# Patient Record
Sex: FEMALE | HISPANIC OR LATINO | Employment: PART TIME | URBAN - METROPOLITAN AREA
[De-identification: names, ages, dates, MRNs, and addresses within clinical notes are randomized per-mention and may not be internally consistent; named-entity substitution may affect disease eponyms.]

---

## 2017-03-29 ENCOUNTER — HOSPITAL ENCOUNTER (OUTPATIENT)
Dept: LAB | Facility: MEDICAL CENTER | Age: 22
End: 2017-03-29
Attending: NURSE PRACTITIONER
Payer: COMMERCIAL

## 2017-03-29 ENCOUNTER — OFFICE VISIT (OUTPATIENT)
Dept: MEDICAL GROUP | Facility: PHYSICIAN GROUP | Age: 22
End: 2017-03-29
Payer: COMMERCIAL

## 2017-03-29 VITALS
TEMPERATURE: 97.5 F | HEIGHT: 59 IN | OXYGEN SATURATION: 97 % | SYSTOLIC BLOOD PRESSURE: 106 MMHG | BODY MASS INDEX: 19.15 KG/M2 | WEIGHT: 95 LBS | RESPIRATION RATE: 12 BRPM | HEART RATE: 63 BPM | DIASTOLIC BLOOD PRESSURE: 64 MMHG

## 2017-03-29 DIAGNOSIS — R07.89 ATYPICAL CHEST PAIN: ICD-10-CM

## 2017-03-29 DIAGNOSIS — Z76.89 ENCOUNTER TO ESTABLISH CARE: ICD-10-CM

## 2017-03-29 LAB
ALBUMIN SERPL BCP-MCNC: 4.6 G/DL (ref 3.2–4.9)
ALBUMIN/GLOB SERPL: 1.6 G/DL
ALP SERPL-CCNC: 82 U/L (ref 30–99)
ALT SERPL-CCNC: 15 U/L (ref 2–50)
ANION GAP SERPL CALC-SCNC: 7 MMOL/L (ref 0–11.9)
AST SERPL-CCNC: 21 U/L (ref 12–45)
BASOPHILS # BLD AUTO: 0.05 K/UL (ref 0–0.12)
BASOPHILS NFR BLD AUTO: 0.6 % (ref 0–1.8)
BILIRUB SERPL-MCNC: 0.6 MG/DL (ref 0.1–1.5)
BUN SERPL-MCNC: 11 MG/DL (ref 8–22)
CALCIUM SERPL-MCNC: 10 MG/DL (ref 8.5–10.5)
CHLORIDE SERPL-SCNC: 101 MMOL/L (ref 96–112)
CO2 SERPL-SCNC: 27 MMOL/L (ref 20–33)
CREAT SERPL-MCNC: 0.74 MG/DL (ref 0.5–1.4)
EOSINOPHIL # BLD: 0.07 K/UL (ref 0–0.51)
EOSINOPHIL NFR BLD AUTO: 0.9 % (ref 0–6.9)
ERYTHROCYTE [DISTWIDTH] IN BLOOD BY AUTOMATED COUNT: 39.7 FL (ref 35.9–50)
GLOBULIN SER CALC-MCNC: 2.9 G/DL (ref 1.9–3.5)
GLUCOSE SERPL-MCNC: 83 MG/DL (ref 65–99)
HCT VFR BLD AUTO: 45.6 % (ref 37–47)
HGB BLD-MCNC: 15.1 G/DL (ref 12–16)
IMM GRANULOCYTES # BLD AUTO: 0.03 K/UL (ref 0–0.11)
IMM GRANULOCYTES NFR BLD AUTO: 0.4 % (ref 0–0.9)
LYMPHOCYTES # BLD: 2.04 K/UL (ref 1–4.8)
LYMPHOCYTES NFR BLD AUTO: 26.1 % (ref 22–41)
MCH RBC QN AUTO: 29.7 PG (ref 27–33)
MCHC RBC AUTO-ENTMCNC: 33.1 G/DL (ref 33.6–35)
MCV RBC AUTO: 89.8 FL (ref 81.4–97.8)
MONOCYTES # BLD: 0.38 K/UL (ref 0–0.85)
MONOCYTES NFR BLD AUTO: 4.9 % (ref 0–13.4)
NEUTROPHILS # BLD: 5.26 K/UL (ref 2–7.15)
NEUTROPHILS NFR BLD AUTO: 67.1 % (ref 44–72)
NRBC # BLD AUTO: 0 K/UL
NRBC BLD-RTO: 0 /100 WBC
PLATELET # BLD AUTO: 238 K/UL (ref 164–446)
PMV BLD AUTO: 11.1 FL (ref 9–12.9)
POTASSIUM SERPL-SCNC: 4.1 MMOL/L (ref 3.6–5.5)
PROT SERPL-MCNC: 7.5 G/DL (ref 6–8.2)
RBC # BLD AUTO: 5.08 M/UL (ref 4.2–5.4)
SODIUM SERPL-SCNC: 135 MMOL/L (ref 135–145)
TSH SERPL DL<=0.005 MIU/L-ACNC: 0.55 UIU/ML (ref 0.3–3.7)
WBC # BLD AUTO: 7.8 K/UL (ref 4.8–10.8)

## 2017-03-29 PROCEDURE — 84443 ASSAY THYROID STIM HORMONE: CPT

## 2017-03-29 PROCEDURE — 80053 COMPREHEN METABOLIC PANEL: CPT

## 2017-03-29 PROCEDURE — 85025 COMPLETE CBC W/AUTO DIFF WBC: CPT

## 2017-03-29 PROCEDURE — 99214 OFFICE O/P EST MOD 30 MIN: CPT | Performed by: NURSE PRACTITIONER

## 2017-03-29 PROCEDURE — 36415 COLL VENOUS BLD VENIPUNCTURE: CPT

## 2017-03-29 ASSESSMENT — PATIENT HEALTH QUESTIONNAIRE - PHQ9: CLINICAL INTERPRETATION OF PHQ2 SCORE: 0

## 2017-03-29 NOTE — PROGRESS NOTES
Chief Complaint   Patient presents with   • New Patient     check up, chest pain x 1 month       HPI:    Sandra Rouse is a 21 y.o. female here to establish care, accompanied today by her mother.     Atypical chest pain  Symptoms started about one month ago. She gets intermittent chest pain about 4 out of 7 days of the week. It comes and goes. If she is anxious or stressed, the pain will increase. She has been experiencing nausea and vomiting when this occurs as well. It is accompanied by hot flashes and lightheadedness. It has awakened her at night a few times, with one occurrence with tingling sensation in left arm. There is also associated sensations of shortness of breath when this occurs  Physical activity like running for more than 10 minutes will cause her to start feeling nauseated. She has tried yoga to help with stress levels.    She has been anxious about school because she had to repeat her semester. She also stopped working to help with her stress levels      Current medicines (including changes today)  No current outpatient prescriptions on file.     No current facility-administered medications for this visit.     She  has no past medical history of Asthma or Migraine.  She  has no past surgical history on file.  Social History   Substance Use Topics   • Smoking status: Never Smoker    • Smokeless tobacco: Never Used   • Alcohol Use: Yes      Comment: occasionally      Social History     Social History Narrative    Patient is single, lives with her family. In school full time, not working      Family History   Problem Relation Age of Onset   • Thyroid Mother    • No Known Problems Father    • Breast Cancer Maternal Aunt 39   • Hypertension Maternal Grandmother    • Heart Disease Paternal Grandmother    • Diabetes Neg Hx    • Depression Neg Hx    • Heart Attack Neg Hx    • Stroke Neg Hx      Family Status   Relation Status Death Age   • Mother Alive    • Father Alive    • Brother Alive    •  "Maternal Aunt Alive      Health Maintenance Topics with due status: Overdue       Topic Date Due    IMM HEP B VACCINE 1995    IMM HEP A VACCINE 08/30/1997    IMM HPV VACCINE 08/30/2007    IMM VARICELLA (CHICKENPOX) VACCINE 08/30/2009    CHLAMYDIA SCREENING 08/30/2012    IMM MENINGOCOCCAL VACCINE (MCV4) 08/30/2012    IMM DTaP/Tdap/Td Vaccine 08/30/2015    IMM INFLUENZA 09/01/2016        ROS  Constitutional: +insomnia. No F/C, night sweats, fatigue, weight gain or loss  Head/Neck: No headache or head injury. No neck pain or limitation of motion, lumps or swelling, stiffness, or leisa enlargement  Eyes: No change in vision, flashing lights, pain, redness, discharge  Ears: right ear pain. No tinnitus, discharge  Nose: No discharge, sinus pain, nosebleeds, allergies  Mouth/Throat: No sores or lesions, sore throat, hoarseness, dysphagia  Lungs: +cough for 3 weeks. No wheezing.  Cardiac: +chest pain and heart racing. No palpitations, syncope or near syncope, NAJERA, or LE edema  Skin: No color changes, itching, dryness, rashes  Heme: No increased bruising or prolonged bleeding  Endo: No heat or cold intolerance, excessively dry skin, sweating, polydipsia, polyuria, or polyphagia.    GI: No pain, n/v, heartburn, blood in stool, constipation or diarrhea  : No dysuria or hematuria   MSK: No joint pain, stiffness, swelling, heat, redness, or limitation of movement.        Objective:     Blood pressure 106/64, pulse 63, temperature 36.4 °C (97.5 °F), resp. rate 12, height 1.511 m (4' 11.49\"), weight 43.092 kg (95 lb), last menstrual period 03/14/2017, SpO2 97 %. Body mass index is 18.87 kg/(m^2).  Physical Exam:  Alert, oriented in no acute distress.  Eye contact is good, speech goal directed, affect bright.  HEENT: EOMI, PERRL, conjunctiva non-injected, sclera non-icteric.  Nares patent with no significant congestion or drainage.  Kassidy pinna, external auditory canals, TM pearly gray with normal light reflex " bilaterally.  Oral mucous membranes pink and moist with no lesions.Oropharynx without erythema or exudate  Neck: supple with no cervical or supraclavicular lymphadenopathy, palpable thyroid nodules or thyromegaly.  Lungs: unlabored, clear to auscultation bilaterally with good excursion.  CV: regular rate and rhythm, no murmurs, no carotid bruits.   Abdomen: soft, non-distended, non-tender. No hernias, hepatosplenomegaly, or masses.   Lower extremities color normal, vascularity normal, no edema, temperature normal  Neuro: Gait steady. Strength all extremities 5/5.     EKG Interpretation-HR is 76 normal EKG, normal sinus rhythm      Assessment and Plan:   The following treatment plan was discussed  1. Encounter to establish care     2. Atypical chest pain  EKG normal without any sign of ischemia that would be responsible for her chest pain. Recommended patient try taking a Tums or Pepcid when this occurs to see if this offers any relief. If not, we have discussed options for management of anxiety and stress levels. Patient reports she feels like this would be beneficial to her and she believes that this is a lot of the cause of her chest pain. Have given her information on a couple of places to call to see if they take her insurance and then once blood work returns if all is normal we will refer her  CBC WITH DIFFERENTIAL    COMP METABOLIC PANEL    TSH    EKG       Followup: Return in about 3 months (around 6/29/2017). sooner should new symptoms or problems arise.

## 2017-03-29 NOTE — ASSESSMENT & PLAN NOTE
Symptoms started about one month ago. She gets intermittent chest pain about 4 out of 7 days of the week. It comes and goes. If she is anxious or stressed, the pain will increase. She has been experiencing nausea and vomiting when this occurs as well. It is accompanied by hot flashes and lightheadedness. It has awakened her at night a few times, with one occurrence with tingling sensation in left arm.  Physical activity like running for more than 10 minutes will cause her to start feeling nauseated. She has tried yoga to help with stress levels.    She has been anxious about school because she had to repeat her semester. She also stopped working.

## 2017-07-16 ENCOUNTER — HOSPITAL ENCOUNTER (OUTPATIENT)
Facility: MEDICAL CENTER | Age: 22
End: 2017-07-16
Attending: EMERGENCY MEDICINE
Payer: COMMERCIAL

## 2017-07-16 ENCOUNTER — OFFICE VISIT (OUTPATIENT)
Dept: URGENT CARE | Facility: PHYSICIAN GROUP | Age: 22
End: 2017-07-16
Payer: COMMERCIAL

## 2017-07-16 VITALS
HEIGHT: 59 IN | RESPIRATION RATE: 12 BRPM | HEART RATE: 79 BPM | OXYGEN SATURATION: 98 % | BODY MASS INDEX: 20.22 KG/M2 | WEIGHT: 100.3 LBS | DIASTOLIC BLOOD PRESSURE: 60 MMHG | SYSTOLIC BLOOD PRESSURE: 102 MMHG | TEMPERATURE: 98.3 F

## 2017-07-16 DIAGNOSIS — N39.0 URINARY TRACT INFECTION WITH HEMATURIA, SITE UNSPECIFIED: ICD-10-CM

## 2017-07-16 DIAGNOSIS — R39.9 SYMPTOMS INVOLVING URINARY SYSTEM: ICD-10-CM

## 2017-07-16 DIAGNOSIS — R31.9 URINARY TRACT INFECTION WITH HEMATURIA, SITE UNSPECIFIED: ICD-10-CM

## 2017-07-16 LAB
APPEARANCE UR: NORMAL
BILIRUB UR STRIP-MCNC: NEGATIVE MG/DL
COLOR UR AUTO: NORMAL
GLUCOSE UR STRIP.AUTO-MCNC: NEGATIVE MG/DL
INT CON NEG: NORMAL
INT CON POS: NORMAL
KETONES UR STRIP.AUTO-MCNC: NEGATIVE MG/DL
LEUKOCYTE ESTERASE UR QL STRIP.AUTO: NORMAL
NITRITE UR QL STRIP.AUTO: NEGATIVE
PH UR STRIP.AUTO: 8 [PH] (ref 5–8)
POC URINE PREGNANCY TEST: NORMAL
PROT UR QL STRIP: NORMAL MG/DL
RBC UR QL AUTO: NORMAL
SP GR UR STRIP.AUTO: 1.01
UROBILINOGEN UR STRIP-MCNC: NEGATIVE MG/DL

## 2017-07-16 PROCEDURE — 87086 URINE CULTURE/COLONY COUNT: CPT

## 2017-07-16 PROCEDURE — 87591 N.GONORRHOEAE DNA AMP PROB: CPT

## 2017-07-16 PROCEDURE — 99214 OFFICE O/P EST MOD 30 MIN: CPT | Performed by: EMERGENCY MEDICINE

## 2017-07-16 PROCEDURE — 81025 URINE PREGNANCY TEST: CPT | Performed by: EMERGENCY MEDICINE

## 2017-07-16 PROCEDURE — 87491 CHLMYD TRACH DNA AMP PROBE: CPT

## 2017-07-16 PROCEDURE — 81002 URINALYSIS NONAUTO W/O SCOPE: CPT | Performed by: EMERGENCY MEDICINE

## 2017-07-16 RX ORDER — SULFAMETHOXAZOLE AND TRIMETHOPRIM 800; 160 MG/1; MG/1
1 TABLET ORAL EVERY 12 HOURS
Qty: 28 TAB | Refills: 0 | Status: SHIPPED | OUTPATIENT
Start: 2017-07-16 | End: 2017-07-30

## 2017-07-16 ASSESSMENT — ENCOUNTER SYMPTOMS
ABDOMINAL PAIN: 1
VOMITING: 1
ANOREXIA: 0
TINGLING: 0
CHILLS: 0
BLOOD IN STOOL: 0
FEVER: 0
SENSORY CHANGE: 0
CHANGE IN BOWEL HABIT: 1
FLANK PAIN: 1
NAUSEA: 1

## 2017-07-17 DIAGNOSIS — R39.9 SYMPTOMS INVOLVING URINARY SYSTEM: ICD-10-CM

## 2017-07-17 LAB
C TRACH DNA SPEC QL NAA+PROBE: NEGATIVE
N GONORRHOEA DNA SPEC QL NAA+PROBE: NEGATIVE
SPECIMEN SOURCE: NORMAL

## 2017-07-17 NOTE — PROGRESS NOTES
Subjective:      Sandra Keneny is a 21 y.o. female who presents with Other            UTI  This is a new problem. Episode onset: 2 days. The problem has been gradually worsening. Associated symptoms include abdominal pain, a change in bowel habit, nausea, urinary symptoms and vomiting. Pertinent negatives include no anorexia, chills, fever or rash. She has tried nothing for the symptoms.    no past medical history of urinary tract problems. Onset of illness with lower backache for several days; no trauma. Then 2 days ago began with dysuria, urinary frequency and urgency and an episode of nausea and vomiting without hematemesis. Yesterday dysuria improved, but noted increased urinary urgency, blood when wiping the area. Notes also loose stools without diarrhea; no melena or hematochezia. She notes sensation of suprapubic fullness, now more right lower back ache sensation. She notes last menstrual period approximately one week ago; on oral contraception. Admits sexually active; denies STD exposure. No vaginal bleeding, discharge, or rash.    Review of Systems   Constitutional: Negative for fever and chills.   Gastrointestinal: Positive for nausea, vomiting, abdominal pain and change in bowel habit. Negative for blood in stool and anorexia.   Genitourinary: Positive for dysuria, urgency, frequency, hematuria and flank pain.   Skin: Negative for itching and rash.   Neurological: Negative for tingling and sensory change.     PMH:  has no past medical history of Asthma or Migraine.  MEDS:   Current outpatient prescriptions:   •  sulfamethoxazole-trimethoprim (BACTRIM DS) 800-160 MG tablet, Take 1 Tab by mouth every 12 hours for 14 days., Disp: 28 Tab, Rfl: 0  •  norethindrone-ethinyl estradiol-iron (MICROGESTIN FE1.5/30) 1.5-30 MG-MCG tablet, Take 1 Tab by mouth every day., Disp: 28 Tab, Rfl: 11  •  ibuprofen (MOTRIN) 800 MG Tab, Take 800 mg by mouth every 8 hours as needed., Disp: , Rfl:   •  ibuprofen (MOTRIN) 600  "MG Tab, Take 1 Tab by mouth every 6 hours as needed., Disp: 30 Tab, Rfl: 2  •  tramadol (ULTRAM) 50 MG Tab, Take 1 Tab by mouth every 6 hours as needed for Moderate Pain., Disp: 15 Tab, Rfl: 0  ALLERGIES: No Known Allergies  SURGHX: History reviewed. No pertinent past surgical history.  SOCHX:  reports that she has never smoked. She has never used smokeless tobacco. She reports that she drinks alcohol. She reports that she does not use illicit drugs.  FH: family history includes Breast Cancer (age of onset: 39) in her maternal aunt; Heart Disease in her paternal grandmother; Hypertension in her maternal grandmother; No Known Problems in her father; Thyroid in her mother. There is no history of Diabetes, Depression, Heart Attack, or Stroke.       Objective:     /60 mmHg  Pulse 79  Temp(Src) 36.8 °C (98.3 °F)  Resp 12  Ht 1.499 m (4' 11.02\")  Wt 45.496 kg (100 lb 4.8 oz)  BMI 20.25 kg/m2  SpO2 98%  Breastfeeding? No     Physical Exam   Constitutional: She is oriented to person, place, and time. She appears well-developed and well-nourished. She is cooperative. She does not have a sickly appearance. She does not appear ill. No distress.   Appears well-hydrated.   Eyes: Conjunctivae are normal. No scleral icterus.   Neck: Phonation normal. Neck supple.   Cardiovascular: Normal rate, regular rhythm and normal heart sounds.    Pulmonary/Chest: Effort normal and breath sounds normal.   Abdominal: Soft. Bowel sounds are normal. She exhibits no distension and no mass. There is no hepatosplenomegaly. There is tenderness in the suprapubic area. There is no rigidity, no rebound, no guarding, no CVA tenderness and no tenderness at McBurney's point.   Musculoskeletal:        Lumbar back: She exhibits normal range of motion and no bony tenderness.   Neurological: She is alert and oriented to person, place, and time.   Skin: Skin is warm and dry.   Psychiatric: She has a normal mood and affect.          Onset " associated with cystitis symptoms; somewhat atypical for episode with nausea and vomiting. Current symptoms not highly suspect for pyelonephritis; but will treat for complicated UTI.  Assessment/Plan:     1. Urinary tract infection with hematuria, site unspecified [N39.0, R31.9]  - sulfamethoxazole-trimethoprim (BACTRIM DS) 800-160 MG tablet; Take 1 Tab by mouth every 12 hours for 14 days.  Dispense: 28 Tab; Refill: 0    2. Symptoms involving urinary system  Positive LE, positive blood- POCT Urinalysis  Negative- POCT Pregnancy  - URINE CULTURE(NEW); Future  - CHLAMYDIA/GC PCR URINE OR SWAB; Future

## 2017-07-17 NOTE — PATIENT INSTRUCTIONS
Go to the nearest hospital Emergency Department, or call 911, if any worsening condition.  Use over-the-counter pain reliever, such as acetaminophen (Tylenol), ibuprofen (Advil, Motrin) or naproxen (Aleve) as needed; follow package directions for dosing.Use an oral probiotic daily, such as Culturelle, Align, or yogurt to reduce gastrointestinal symptoms.  Test results will be available in the next 1-3 days; the clinic will contact you with results.    Urinary Tract Infection  Urinary tract infections (UTIs) can develop anywhere along your urinary tract. Your urinary tract is your body's drainage system for removing wastes and extra water. Your urinary tract includes two kidneys, two ureters, a bladder, and a urethra. Your kidneys are a pair of bean-shaped organs. Each kidney is about the size of your fist. They are located below your ribs, one on each side of your spine.  CAUSES  Infections are caused by microbes, which are microscopic organisms, including fungi, viruses, and bacteria. These organisms are so small that they can only be seen through a microscope. Bacteria are the microbes that most commonly cause UTIs.  SYMPTOMS   Symptoms of UTIs may vary by age and gender of the patient and by the location of the infection. Symptoms in young women typically include a frequent and intense urge to urinate and a painful, burning feeling in the bladder or urethra during urination. Older women and men are more likely to be tired, shaky, and weak and have muscle aches and abdominal pain. A fever may mean the infection is in your kidneys. Other symptoms of a kidney infection include pain in your back or sides below the ribs, nausea, and vomiting.  DIAGNOSIS  To diagnose a UTI, your caregiver will ask you about your symptoms. Your caregiver also will ask to provide a urine sample. The urine sample will be tested for bacteria and white blood cells. White blood cells are made by your body to help fight infection.  TREATMENT    Typically, UTIs can be treated with medication. Because most UTIs are caused by a bacterial infection, they usually can be treated with the use of antibiotics. The choice of antibiotic and length of treatment depend on your symptoms and the type of bacteria causing your infection.  HOME CARE INSTRUCTIONS  · If you were prescribed antibiotics, take them exactly as your caregiver instructs you. Finish the medication even if you feel better after you have only taken some of the medication.  · Drink enough water and fluids to keep your urine clear or pale yellow.  · Avoid caffeine, tea, and carbonated beverages. They tend to irritate your bladder.  · Empty your bladder often. Avoid holding urine for long periods of time.  · Empty your bladder before and after sexual intercourse.  · After a bowel movement, women should cleanse from front to back. Use each tissue only once.  SEEK MEDICAL CARE IF:   · You have back pain.  · You develop a fever.  · Your symptoms do not begin to resolve within 3 days.  SEEK IMMEDIATE MEDICAL CARE IF:   · You have severe back pain or lower abdominal pain.  · You develop chills.  · You have nausea or vomiting.  · You have continued burning or discomfort with urination.  MAKE SURE YOU:   · Understand these instructions.  · Will watch your condition.  · Will get help right away if you are not doing well or get worse.     This information is not intended to replace advice given to you by your health care provider. Make sure you discuss any questions you have with your health care provider.     Document Released: 09/27/2006 Document Revised: 01/08/2016 Document Reviewed: 01/25/2013  [employer] Interactive Patient Education ©2016 [employer]

## 2017-07-18 ENCOUNTER — TELEPHONE (OUTPATIENT)
Dept: URGENT CARE | Facility: PHYSICIAN GROUP | Age: 22
End: 2017-07-18

## 2017-07-18 NOTE — TELEPHONE ENCOUNTER
Left phone message for patient to call back [place] .  Please advise patient of negative urine culture test; recommend return for re-evaluation today, or ED if not available.

## 2017-07-19 LAB
BACTERIA UR CULT: NORMAL
SIGNIFICANT IND 70042: NORMAL
SOURCE SOURCE: NORMAL

## 2017-08-05 ENCOUNTER — OFFICE VISIT (OUTPATIENT)
Dept: URGENT CARE | Facility: PHYSICIAN GROUP | Age: 22
End: 2017-08-05
Payer: COMMERCIAL

## 2017-08-05 VITALS
BODY MASS INDEX: 19.76 KG/M2 | OXYGEN SATURATION: 99 % | SYSTOLIC BLOOD PRESSURE: 96 MMHG | DIASTOLIC BLOOD PRESSURE: 62 MMHG | TEMPERATURE: 99 F | HEART RATE: 76 BPM | HEIGHT: 59 IN | WEIGHT: 98 LBS

## 2017-08-05 DIAGNOSIS — R21 RASH AND NONSPECIFIC SKIN ERUPTION: Primary | ICD-10-CM

## 2017-08-05 DIAGNOSIS — B35.4 RINGWORM OF BODY: ICD-10-CM

## 2017-08-05 PROCEDURE — 99214 OFFICE O/P EST MOD 30 MIN: CPT | Performed by: PHYSICIAN ASSISTANT

## 2017-08-05 RX ORDER — HYDROXYZINE HYDROCHLORIDE 25 MG/1
25 TABLET, FILM COATED ORAL NIGHTLY PRN
Qty: 30 TAB | Refills: 0 | Status: SHIPPED | OUTPATIENT
Start: 2017-08-05 | End: 2018-11-25

## 2017-08-05 RX ORDER — THERMOMETER, ELECTRONIC,ORAL
EACH MISCELLANEOUS
Qty: 1 TUBE | Refills: 0 | Status: SHIPPED | OUTPATIENT
Start: 2017-08-05 | End: 2018-11-25

## 2017-08-05 RX ORDER — THERMOMETER, ELECTRONIC,ORAL
EACH MISCELLANEOUS
Qty: 1 TUBE | Refills: 0 | Status: SHIPPED | OUTPATIENT
Start: 2017-08-05 | End: 2017-08-05 | Stop reason: SDUPTHER

## 2017-08-05 ASSESSMENT — PAIN SCALES - GENERAL: PAINLEVEL: NO PAIN

## 2017-08-05 NOTE — MR AVS SNAPSHOT
"Sandra Rouse   2017 11:45 AM   Office Visit   MRN: [id number]    Department:  [facility]   Dept Phone:  [phone]    Description:  Female : 1995   Provider:  Kathleen Wilcox PA-C           Reason for Visit     Rash abdomen, back, leg, x 1 week      Allergies as of 2017     No Known Allergies      You were diagnosed with     Rash and nonspecific skin eruption   [043162]         Vital Signs     Blood Pressure Pulse Temperature Height Weight Body Mass Index    96/62 mmHg 76 37.2 °C (99 °F) 1.511 m (4' 11.49\") 44.453 kg (98 lb) 19.47 kg/m2    Oxygen Saturation Last Menstrual Period Breastfeeding? Smoking Status          99% 2017 No Never Smoker         Basic Information     Date Of Birth Sex Race Ethnicity Preferred Language    1995 Female  or , Unable to Obtain Unknown English      Your appointments     Sep 11, 2017 11:00 AM   Annual Exam with Stefanie Walton M.D.   [facility]    [address]  [facility] [id number] [phone]              Problem List              ICD-10-CM Priority Class Noted - Resolved    Breakthrough bleeding on birth control pills N92.1   2016 - Present    Atypical chest pain R07.89   3/29/2017 - Present      Health Maintenance        Date Due Completion Dates    IMM MENINGOCOCCAL VACCINE (MCV4) (2 of 2) 2009    IMM INFLUENZA (1) 2017 ---    IMM DTaP/Tdap/Td Vaccine (2 - Td) 2018            Current Immunizations     HPV 9-VALENT VACCINE (GARDASIL 9) 2011, 2010, 2009    Hepatitis A Vaccine, Ped/Adol 2007, 2003    Hepatitis B Vaccine Non-Recombivax (Ped/Adol) 1997, 1996, 1996    Meningococcal Conjugate Vaccine MCV4 (Menactra) 2009    Tdap Vaccine 2008    Tuberculin Skin Test 2013  3:10 PM, 2013  3:15 PM    Varicella Vaccine Live 2007, 1999      Below " and/or attached are the medications your provider expects you to take. Review all of your home medications and newly ordered medications with your provider and/or pharmacist. Follow medication instructions as directed by your provider and/or pharmacist. Please keep your medication list with you and share with your provider. Update the information when medications are discontinued, doses are changed, or new medications (including over-the-counter products) are added; and carry medication information at all times in the event of emergency situations     Allergies:  No Known Allergies          Medications  Valid as of: August 05, 2017 - 12:54 PM    Generic Name Brand Name Tablet Size Instructions for use    HydrOXYzine HCl (Tab) ATARAX 25 MG Take 1 Tab by mouth at bedtime as needed for Itching.        Ibuprofen (Tab) MOTRIN 800 MG Take 800 mg by mouth every 8 hours as needed.        Ibuprofen (Tab) MOTRIN 600 MG Take 1 Tab by mouth every 6 hours as needed.        Norethin Ace-Eth Estrad-FE (Tab) MICROGESTIN FE1.5/30 1.5-30 MG-MCG Take 1 Tab by mouth every day.        Tolnaftate (Cream) TINACTIN 1 % Apply to rash 2 times daily until rash resolves and then for 2 additional weeks.        TraMADol HCl (Tab) ULTRAM 50 MG Take 1 Tab by mouth every 6 hours as needed for Moderate Pain.        .                 Medicines prescribed today were sent to:     [facility] [address] [place] [id number]    Phone: [phone] Fax: [phone]    Open 24 Hours?: No      Medication refill instructions:       If your prescription bottle indicates you have medication refills left, it is not necessary to call your provider’s office. Please contact your pharmacy and they will refill your medication.    If your prescription bottle indicates you do not have any refills left, you may request refills at any time through one of the following ways: The online [employer] system (except  Urgent Care), by calling your provider’s office, or by asking your pharmacy to contact your provider’s office with a refill request. Medication refills are processed only during regular business hours and may not be available until the next business day. Your provider may request additional information or to have a follow-up visit with you prior to refilling your medication.   *Please Note: Medication refills are assigned a new Rx number when refilled electronically. Your pharmacy may indicate that no refills were authorized even though a new prescription for the same medication is available at the pharmacy. Please request the medicine by name with the pharmacy before contacting your provider for a refill.        Instructions    Rash  A rash is a change in the color or texture of the skin. There are many different types of rashes. You may have other problems that accompany your rash.  CAUSES   · Infections.  · Allergic reactions. This can include allergies to pets or foods.  · Certain medicines.  · Exposure to certain chemicals, soaps, or cosmetics.  · Heat.  · Exposure to poisonous plants.  · Tumors, both cancerous and noncancerous.  SYMPTOMS   · Redness.  · Scaly skin.  · Itchy skin.  · Dry or cracked skin.  · Bumps.  · Blisters.  · Pain.  DIAGNOSIS   Your caregiver may do a physical exam to determine what type of rash you have. A skin sample (biopsy) may be taken and examined under a microscope.  TREATMENT   Treatment depends on the type of rash you have. Your caregiver may prescribe certain medicines. For serious conditions, you may need to see a skin doctor (dermatologist).  HOME CARE INSTRUCTIONS   · Avoid the substance that caused your rash.  · Do not scratch your rash. This can cause infection.  · You may take cool baths to help stop itching.  · Only take over-the-counter or prescription medicines as directed by your caregiver.  · Keep all follow-up appointments as directed by your caregiver.  SEEK IMMEDIATE  MEDICAL CARE IF:  · You have increasing pain, swelling, or redness.  · You have a fever.  · You have new or severe symptoms.  · You have body aches, diarrhea, or vomiting.  · Your rash is not better after 3 days.  MAKE SURE YOU:  · Understand these instructions.  · Will watch your condition.  · Will get help right away if you are not doing well or get worse.     This information is not intended to replace advice given to you by your health care provider. Make sure you discuss any questions you have with your health care provider.     Document Released: 12/08/2003 Document Revised: 01/08/2016 Document Reviewed: 10/01/2012  [employer] Interactive Patient Education ©2016 [employer]            [employer] Access Code: Activation code not generated  Current [employer] Status: Active

## 2017-08-05 NOTE — PROGRESS NOTES
"Subjective:      Sandra Rouse is a 21 y.o. female who presents with Rash    Pt PMH, SocHx, SurgHx, FamHx, Drug allergies and medications reviewed with pt/EPIC.      Family history reviewed, it is not pertinent to this complaint.           HPI Comments:         Rash  This is a new problem. The current episode started in the past 7 days. The problem is unchanged. The rash is characterized by itchiness, redness and scaling. It is unknown if there was an exposure to a precipitant. Pertinent negatives include no fever or sore throat. Past treatments include nothing. The treatment provided no relief. There is no history of eczema.       Review of Systems   Constitutional: Negative for fever.   HENT: Negative for sore throat.    Skin: Positive for itching and rash.   All other systems reviewed and are negative.         Objective:     BP 96/62 mmHg  Pulse 76  Temp(Src) 37.2 °C (99 °F)  Ht 1.511 m (4' 11.49\")  Wt 44.453 kg (98 lb)  BMI 19.47 kg/m2  SpO2 99%  LMP 08/02/2017  Breastfeeding? No     Physical Exam   Skin: Rash noted. Rash is maculopapular.        Few maculopapular lesions to abdomen and back, very itchy.  Rash does not follow any particular pattern, not consistent with scabies or  bed bugs. More likely insect bites.      Three lesions to legs appear like tinea.           Assessment/Plan:     1. Rash and nonspecific skin eruption  hydrOXYzine (ATARAX) 25 MG Tab    tolnaftate (TINACTIN) 1 % Cream    DISCONTINUED: tolnaftate (TINACTIN) 1 % Cream   2. Ringworm of body       PT should follow up with PCP in 1-2 days for re-evaluation if symptoms have not improved.  Discussed red flags and reasons to return to UC or ED.  Pt and/or family verbalized understanding of diagnosis and follow up instructions and was given informational handout on diagnosis.  PT discharged.         "

## 2017-08-05 NOTE — PATIENT INSTRUCTIONS
Rash  A rash is a change in the color or texture of the skin. There are many different types of rashes. You may have other problems that accompany your rash.  CAUSES   · Infections.  · Allergic reactions. This can include allergies to pets or foods.  · Certain medicines.  · Exposure to certain chemicals, soaps, or cosmetics.  · Heat.  · Exposure to poisonous plants.  · Tumors, both cancerous and noncancerous.  SYMPTOMS   · Redness.  · Scaly skin.  · Itchy skin.  · Dry or cracked skin.  · Bumps.  · Blisters.  · Pain.  DIAGNOSIS   Your caregiver may do a physical exam to determine what type of rash you have. A skin sample (biopsy) may be taken and examined under a microscope.  TREATMENT   Treatment depends on the type of rash you have. Your caregiver may prescribe certain medicines. For serious conditions, you may need to see a skin doctor (dermatologist).  HOME CARE INSTRUCTIONS   · Avoid the substance that caused your rash.  · Do not scratch your rash. This can cause infection.  · You may take cool baths to help stop itching.  · Only take over-the-counter or prescription medicines as directed by your caregiver.  · Keep all follow-up appointments as directed by your caregiver.  SEEK IMMEDIATE MEDICAL CARE IF:  · You have increasing pain, swelling, or redness.  · You have a fever.  · You have new or severe symptoms.  · You have body aches, diarrhea, or vomiting.  · Your rash is not better after 3 days.  MAKE SURE YOU:  · Understand these instructions.  · Will watch your condition.  · Will get help right away if you are not doing well or get worse.     This information is not intended to replace advice given to you by your health care provider. Make sure you discuss any questions you have with your health care provider.     Document Released: 12/08/2003 Document Revised: 01/08/2016 Document Reviewed: 10/01/2012  [employer] Interactive Patient Education ©2016 [employer]

## 2017-08-05 NOTE — Clinical Note
August 5, 2017         Patient: Sandra Rouse   YOB: 1995   Date of Visit: 8/5/2017           To Whom it May Concern:    Sandra Rouse was seen in my clinic on 8/5/2017. She may return to work today.     If you have any questions or concerns, please don't hesitate to call.        Sincerely,           Kathleen Wilcox PA-C  Electronically Signed

## 2017-08-07 ASSESSMENT — ENCOUNTER SYMPTOMS
FEVER: 0
SORE THROAT: 0

## 2017-08-10 ENCOUNTER — OFFICE VISIT (OUTPATIENT)
Dept: URGENT CARE | Facility: PHYSICIAN GROUP | Age: 22
End: 2017-08-10
Payer: COMMERCIAL

## 2017-08-10 VITALS
TEMPERATURE: 98.2 F | HEART RATE: 72 BPM | OXYGEN SATURATION: 100 % | WEIGHT: 124 LBS | BODY MASS INDEX: 25 KG/M2 | SYSTOLIC BLOOD PRESSURE: 116 MMHG | HEIGHT: 59 IN | DIASTOLIC BLOOD PRESSURE: 62 MMHG

## 2017-08-10 DIAGNOSIS — L42 PITYRIASIS ROSEA: ICD-10-CM

## 2017-08-10 PROCEDURE — 99214 OFFICE O/P EST MOD 30 MIN: CPT | Performed by: FAMILY MEDICINE

## 2017-08-10 RX ORDER — TRIAMCINOLONE ACETONIDE 1 MG/G
1 CREAM TOPICAL 2 TIMES DAILY
Qty: 1 TUBE | Refills: 0 | Status: SHIPPED | OUTPATIENT
Start: 2017-08-10 | End: 2018-11-25

## 2017-08-10 RX ORDER — VALACYCLOVIR HYDROCHLORIDE 1 G/1
1000 TABLET, FILM COATED ORAL 3 TIMES DAILY
Qty: 21 TAB | Refills: 0 | Status: SHIPPED | OUTPATIENT
Start: 2017-08-10 | End: 2017-08-17

## 2017-08-10 NOTE — PATIENT INSTRUCTIONS
Rash  A rash is a change in the color or texture of the skin. There are many different types of rashes. You may have other problems that accompany your rash.  CAUSES   · Infections.  · Allergic reactions. This can include allergies to pets or foods.  · Certain medicines.  · Exposure to certain chemicals, soaps, or cosmetics.  · Heat.  · Exposure to poisonous plants.  · Tumors, both cancerous and noncancerous.  SYMPTOMS   · Redness.  · Scaly skin.  · Itchy skin.  · Dry or cracked skin.  · Bumps.  · Blisters.  · Pain.  DIAGNOSIS   Your caregiver may do a physical exam to determine what type of rash you have. A skin sample (biopsy) may be taken and examined under a microscope.  TREATMENT   Treatment depends on the type of rash you have. Your caregiver may prescribe certain medicines. For serious conditions, you may need to see a skin doctor (dermatologist).  HOME CARE INSTRUCTIONS   · Avoid the substance that caused your rash.  · Do not scratch your rash. This can cause infection.  · You may take cool baths to help stop itching.  · Only take over-the-counter or prescription medicines as directed by your caregiver.  · Keep all follow-up appointments as directed by your caregiver.  SEEK IMMEDIATE MEDICAL CARE IF:  · You have increasing pain, swelling, or redness.  · You have a fever.  · You have new or severe symptoms.  · You have body aches, diarrhea, or vomiting.  · Your rash is not better after 3 days.  MAKE SURE YOU:  · Understand these instructions.  · Will watch your condition.  · Will get help right away if you are not doing well or get worse.     This information is not intended to replace advice given to you by your health care provider. Make sure you discuss any questions you have with your health care provider.     Document Released: 12/08/2003 Document Revised: 01/08/2016 Document Reviewed: 10/01/2012  [employer] Interactive Patient Education ©2016 [employer]

## 2017-08-28 ASSESSMENT — ENCOUNTER SYMPTOMS
SORE THROAT: 0
COUGH: 0
FEVER: 1

## 2017-08-28 NOTE — PROGRESS NOTES
"Subjective:      Sandra Rouse is a 21 y.o. female who presents with Rash (Rash started on chest/spread all over body/pt states she felt she had a fever last night  x1 week )            Rash   This is a new problem. The current episode started in the past 7 days. The problem has been rapidly worsening since onset. The rash is diffuse. Associated symptoms include a fever. Pertinent negatives include no congestion, cough or sore throat.       Review of Systems   Constitutional: Positive for fever.   HENT: Negative for congestion and sore throat.    Respiratory: Negative for cough.    Skin: Positive for rash.     No Known Allergies      Objective:     /62   Pulse 72   Temp 36.8 °C (98.2 °F)   Ht 1.511 m (4' 11.49\")   Wt 56.2 kg (124 lb)   LMP 08/02/2017   SpO2 100%   BMI 24.64 kg/m²      Physical Exam   Constitutional: She is oriented to person, place, and time. She appears well-developed and well-nourished. No distress.   HENT:   Head: Normocephalic and atraumatic.   Eyes: Conjunctivae and EOM are normal. Pupils are equal, round, and reactive to light.   Cardiovascular: Normal rate and regular rhythm.    No murmur heard.  Pulmonary/Chest: Effort normal and breath sounds normal. No respiratory distress.   Abdominal: Soft. She exhibits no distension. There is no tenderness.   Neurological: She is alert and oriented to person, place, and time. She has normal reflexes. No sensory deficit.   Skin: Skin is warm and dry. Rash noted. Rash is macular.   Psychiatric: She has a normal mood and affect.               Assessment/Plan:     1. Pityriasis rosea  Differential diagnosis, natural history, supportive care, and indications for immediate follow-up discussed.   - valacyclovir (VALTREX) 1 GM Tab; Take 1 Tab by mouth 3 times a day for 7 days.  Dispense: 21 Tab; Refill: 0  - triamcinolone acetonide (KENALOG) 0.1 % Cream; Apply 1 Film to affected area(s) 2 times a day.  Dispense: 1 Tube; Refill: 0      "

## 2017-12-04 ENCOUNTER — GYNECOLOGY VISIT (OUTPATIENT)
Dept: OBGYN | Facility: MEDICAL CENTER | Age: 22
End: 2017-12-04
Payer: COMMERCIAL

## 2017-12-04 ENCOUNTER — HOSPITAL ENCOUNTER (OUTPATIENT)
Facility: MEDICAL CENTER | Age: 22
End: 2017-12-04
Attending: OBSTETRICS & GYNECOLOGY
Payer: COMMERCIAL

## 2017-12-04 VITALS
BODY MASS INDEX: 20.56 KG/M2 | SYSTOLIC BLOOD PRESSURE: 110 MMHG | DIASTOLIC BLOOD PRESSURE: 64 MMHG | HEIGHT: 59 IN | WEIGHT: 102 LBS

## 2017-12-04 DIAGNOSIS — Z12.4 SCREENING FOR CERVICAL CANCER: ICD-10-CM

## 2017-12-04 DIAGNOSIS — Z01.419 WELL WOMAN EXAM: ICD-10-CM

## 2017-12-04 DIAGNOSIS — Z30.09 FAMILY PLANNING EDUCATION, GUIDANCE, AND COUNSELING: ICD-10-CM

## 2017-12-04 DIAGNOSIS — Z11.3 SCREEN FOR SEXUALLY TRANSMITTED DISEASES: ICD-10-CM

## 2017-12-04 PROBLEM — R07.89 ATYPICAL CHEST PAIN: Status: RESOLVED | Noted: 2017-03-29 | Resolved: 2017-12-04

## 2017-12-04 PROCEDURE — 88175 CYTOPATH C/V AUTO FLUID REDO: CPT

## 2017-12-04 PROCEDURE — 87591 N.GONORRHOEAE DNA AMP PROB: CPT

## 2017-12-04 PROCEDURE — 87491 CHLMYD TRACH DNA AMP PROBE: CPT

## 2017-12-04 PROCEDURE — 99395 PREV VISIT EST AGE 18-39: CPT | Performed by: OBSTETRICS & GYNECOLOGY

## 2017-12-04 RX ORDER — NORGESTIMATE AND ETHINYL ESTRADIOL 0.25-0.035
1 KIT ORAL DAILY
Qty: 28 TAB | Refills: 11 | Status: SHIPPED | OUTPATIENT
Start: 2017-12-04 | End: 2018-11-25

## 2017-12-04 NOTE — PROGRESS NOTES
ANNUAL Gynecologic Exam      [facility] Comments:   22 YEAR OLD presents for well woman exam.  Patient's last menstrual period was 11/27/2017.  Her first pap smear today  No pelvic pains, no dyspareunia  She d/c'd her OCP 6 months ago. She would like to resume taking it  Her periods are regular lasting 1 week, moderate flow  Healthy lifestyle  Never smoker  No family history of breast/ovarian cancer    Review of Systems   Pertinent positives documented in HPI and all other systems reviewed & are negative    All PMH, PSH, allergies, social history and FH reviewed and updated today:  History reviewed. No pertinent past medical history.  History reviewed. No pertinent surgical history.  Patient has no known allergies.  Social History     Social History   • Marital status: Single     Spouse name: N/A   • Number of children: N/A   • Years of education: N/A     Social History Main Topics   • Smoking status: Never Smoker   • Smokeless tobacco: Never Used   • Alcohol use No      Comment: occasionally    • Drug use: No   • Sexual activity: Yes     Partners: Male     Birth control/ protection: Condom     Other Topics Concern   • Not on file     Social History Narrative    ** Merged History Encounter **         ** Data from: 5/17/16 [facility] Dept: MED [facility]         ** Data from: 3/29/17 [facility]    Patient is single, lives with her family. In school full time, not working      Family History   Problem Relation Age of Onset   • Thyroid Mother    • No Known Problems Father    • Breast Cancer Maternal Aunt 39   • Hypertension Maternal Grandmother    • Heart Disease Paternal Grandmother    • Diabetes Neg Hx    • Depression Neg Hx    • Heart Attack Neg Hx    • Stroke Neg Hx      Medications:   Current Outpatient Prescriptions Ordered in [facility]   Medication Sig Dispense Refill   • norgestimate-ethinyl estradiol (ORTHO-CYCLEN) 0.25-35 MG-MCG per tablet Take 1 Tab by mouth every day. 28 Tab 11   • triamcinolone  "acetonide (KENALOG) 0.1 % Cream Apply 1 Film to affected area(s) 2 times a day. (Patient not taking: Reported on 12/4/2017) 1 Tube 0   • hydrOXYzine (ATARAX) 25 MG Tab Take 1 Tab by mouth at bedtime as needed for Itching. (Patient not taking: Reported on 12/4/2017) 30 Tab 0   • tolnaftate (TINACTIN) 1 % Cream Apply to rash 2 times daily until rash resolves and then for 2 additional weeks. (Patient not taking: Reported on 12/4/2017) 1 Tube 0   • ibuprofen (MOTRIN) 800 MG Tab Take 800 mg by mouth every 8 hours as needed.     • ibuprofen (MOTRIN) 600 MG Tab Take 1 Tab by mouth every 6 hours as needed. (Patient not taking: Reported on 12/4/2017) 30 Tab 2   • norethindrone-ethinyl estradiol-iron (MICROGESTIN FE1.5/30) 1.5-30 MG-MCG tablet Take 1 Tab by mouth every day. (Patient not taking: Reported on 12/4/2017) 28 Tab 11   • tramadol (ULTRAM) 50 MG Tab Take 1 Tab by mouth every 6 hours as needed for Moderate Pain. (Patient not taking: Reported on 12/4/2017) 15 Tab 0     No current Epic-ordered facility-administered medications on file.           Objective:   Vital measurements:  Blood pressure 110/64, height 1.511 m (4' 11.49\"), weight 46.3 kg (102 lb), last menstrual period 11/27/2017.  Body mass index is 20.26 kg/m². (Goal BM I>18 <25)    Physical Exam   Nursing note and vitals reviewed.  Constitutional: She is oriented to person, place, and time. She appears well-developed and well-nourished. No distress.     HEENT:   Head: Normocephalic and atraumatic.   Right Ear: External ear normal.   Left Ear: External ear normal.   Nose: Nose normal.   Eyes: Conjunctivae and EOM are normal. Pupils are equal, round, and reactive to light. No scleral icterus.     Neck: Normal range of motion. Neck supple. No tracheal deviation present. No thyromegaly present.     Pulmonary/Chest: Effort normal and breath sounds normal. No respiratory distress. She has no wheezes. She has no rales. She exhibits no tenderness.     Cardiovascular: " Regular, rate and rhythm. No JVD.    Abdominal: Soft. Bowel sounds are normal. She exhibits no distension and no mass. No tenderness. She has no rebound and no guarding.     Breast:  Symmetrical, normal consistency without masses., No dimpling or skin changes, negative, No masses    Genitourinary:  Pelvic exam was performed with patient supine.  External genitalia with no abnormal pigmentation, labial fusion,rash, tenderness, lesion or injury to the labia bilaterally.  Vagina is moist with no lesions, foul discharge, erythema, tenderness or bleeding. No foreign body around the vagina or signs of injury.   Cervix exhibits no motion tenderness, no discharge and no friability.   Uterus is not deviated, not enlarged, not fixed and not tender.  Right adnexum displays no mass, no tenderness and no fullness. Left adnexum displays no mass, no tenderness and no fullness.     Musculoskeletal: Normal range of motion. She exhibits no edema and no tenderness.     Lymphadenopathy: She has no cervical adenopathy.     Neurological: She is alert and oriented to person, place, and time. She exhibits normal muscle tone.     Skin: Skin is warm and dry. No rash noted. She is not diaphoretic. No erythema. No pallor.     Psychiatric: She has a normal mood and affect. Her behavior is normal. Judgment and thought content normal  Assessment:     1. Well woman exam     2. Screening for cervical cancer  THINPREP RFLX HPV ASCUS W/CTNG   3. Screen for sexually transmitted diseases  THINPREP RFLX HPV ASCUS W/CTNG   4. Family planning education, guidance, and counseling       Plan:   Pap and physical exam performed  Monthly SBE. Self breast awareness education  Counseling: breast self exam, STD prevention, HIV risk factors and prevention, use and side effects of OCPs and family planning choices. Discussed nexplanon. Given brochure  Encourage exercise and proper diet.  Mammograms starting @ age 40 annually.  See medications and orders placed in  encounter report.

## 2017-12-05 LAB
C TRACH DNA GENITAL QL NAA+PROBE: NEGATIVE
CYTOLOGY REG CYTOL: NORMAL
N GONORRHOEA DNA GENITAL QL NAA+PROBE: NEGATIVE
SPECIMEN SOURCE: NORMAL

## 2017-12-06 PROBLEM — R87.612 LGSIL ON PAP SMEAR OF CERVIX: Status: ACTIVE | Noted: 2017-12-06

## 2018-01-03 ENCOUNTER — APPOINTMENT (RX ONLY)
Dept: URBAN - METROPOLITAN AREA CLINIC 4 | Facility: CLINIC | Age: 23
Setting detail: DERMATOLOGY
End: 2018-01-03

## 2018-01-03 DIAGNOSIS — Q819 OTHER SPECIFIED ANOMALIES OF SKIN: ICD-10-CM

## 2018-01-03 DIAGNOSIS — Q828 OTHER SPECIFIED ANOMALIES OF SKIN: ICD-10-CM

## 2018-01-03 DIAGNOSIS — D22 MELANOCYTIC NEVI: ICD-10-CM

## 2018-01-03 DIAGNOSIS — L81.0 POSTINFLAMMATORY HYPERPIGMENTATION: ICD-10-CM

## 2018-01-03 DIAGNOSIS — Q826 OTHER SPECIFIED ANOMALIES OF SKIN: ICD-10-CM

## 2018-01-03 PROBLEM — D22.62 MELANOCYTIC NEVI OF LEFT UPPER LIMB, INCLUDING SHOULDER: Status: ACTIVE | Noted: 2018-01-03

## 2018-01-03 PROBLEM — Q82.8 OTHER SPECIFIED CONGENITAL MALFORMATIONS OF SKIN: Status: ACTIVE | Noted: 2018-01-03

## 2018-01-03 PROCEDURE — 99202 OFFICE O/P NEW SF 15 MIN: CPT

## 2018-01-03 PROCEDURE — ? COUNSELING

## 2018-01-03 ASSESSMENT — LOCATION DETAILED DESCRIPTION DERM
LOCATION DETAILED: RIGHT ANTERIOR PROXIMAL UPPER ARM
LOCATION DETAILED: LEFT ANTERIOR PROXIMAL THIGH
LOCATION DETAILED: RIGHT PROXIMAL POSTERIOR UPPER ARM
LOCATION DETAILED: LEFT ANTERIOR SHOULDER
LOCATION DETAILED: LEFT PROXIMAL POSTERIOR UPPER ARM
LOCATION DETAILED: RIGHT ANTERIOR PROXIMAL THIGH
LOCATION DETAILED: RIGHT SUPERIOR MEDIAL UPPER BACK
LOCATION DETAILED: PERIUMBILICAL SKIN

## 2018-01-03 ASSESSMENT — LOCATION SIMPLE DESCRIPTION DERM
LOCATION SIMPLE: RIGHT UPPER ARM
LOCATION SIMPLE: LEFT THIGH
LOCATION SIMPLE: LEFT SHOULDER
LOCATION SIMPLE: RIGHT UPPER BACK
LOCATION SIMPLE: LEFT POSTERIOR UPPER ARM
LOCATION SIMPLE: ABDOMEN
LOCATION SIMPLE: RIGHT THIGH
LOCATION SIMPLE: RIGHT POSTERIOR UPPER ARM

## 2018-01-03 ASSESSMENT — LOCATION ZONE DERM
LOCATION ZONE: LEG
LOCATION ZONE: ARM
LOCATION ZONE: TRUNK

## 2018-01-03 NOTE — HPI: DISCOLORATION
How Severe Is Your Skin Discoloration?: mild
Additional History: It started in September with a scaly pink rash on her upper body, it did itch,  She was treated several times for it with Benadryl.  She is here today because the rash is gone but now her skin has lighter spots on it. It is asymptomatic now.

## 2018-01-03 NOTE — PROCEDURE: COUNSELING
Detail Level: Detailed
Detail Level: Zone
Patient Specific Counseling (Will Not Stick From Patient To Patient): Advised heavy moisturizing as well sunscreen and protective clothing. Notified pt it’s a slow process to clear up. Return to clinic 2-3 months if not clear,  sooner of worse.

## 2018-04-10 ENCOUNTER — NON-PROVIDER VISIT (OUTPATIENT)
Dept: URGENT CARE | Facility: PHYSICIAN GROUP | Age: 23
End: 2018-04-10

## 2018-04-10 ENCOUNTER — APPOINTMENT (OUTPATIENT)
Dept: LAB | Facility: MEDICAL CENTER | Age: 23
End: 2018-04-10
Attending: NURSE PRACTITIONER
Payer: COMMERCIAL

## 2018-04-10 DIAGNOSIS — Z11.1 ENCOUNTER FOR PPD TEST: Primary | ICD-10-CM

## 2018-04-10 PROCEDURE — 86580 TB INTRADERMAL TEST: CPT | Performed by: PHYSICIAN ASSISTANT

## 2018-04-11 ENCOUNTER — NON-PROVIDER VISIT (OUTPATIENT)
Dept: URGENT CARE | Facility: PHYSICIAN GROUP | Age: 23
End: 2018-04-11
Payer: COMMERCIAL

## 2018-04-11 LAB — TB WHEAL 3D P 5 TU DIAM: NORMAL MM

## 2018-11-25 ENCOUNTER — HOSPITAL ENCOUNTER (OUTPATIENT)
Facility: MEDICAL CENTER | Age: 23
End: 2018-11-25
Attending: NURSE PRACTITIONER
Payer: COMMERCIAL

## 2018-11-25 ENCOUNTER — HOSPITAL ENCOUNTER (OUTPATIENT)
Dept: RADIOLOGY | Facility: MEDICAL CENTER | Age: 23
End: 2018-11-25
Attending: NURSE PRACTITIONER
Payer: COMMERCIAL

## 2018-11-25 ENCOUNTER — OFFICE VISIT (OUTPATIENT)
Dept: URGENT CARE | Facility: PHYSICIAN GROUP | Age: 23
End: 2018-11-25
Payer: COMMERCIAL

## 2018-11-25 VITALS
HEART RATE: 84 BPM | OXYGEN SATURATION: 100 % | TEMPERATURE: 97.3 F | BODY MASS INDEX: 20.42 KG/M2 | WEIGHT: 104 LBS | HEIGHT: 60 IN | SYSTOLIC BLOOD PRESSURE: 108 MMHG | RESPIRATION RATE: 18 BRPM | DIASTOLIC BLOOD PRESSURE: 70 MMHG

## 2018-11-25 DIAGNOSIS — N39.0 ACUTE UTI (URINARY TRACT INFECTION): ICD-10-CM

## 2018-11-25 DIAGNOSIS — R31.0 GROSS HEMATURIA: ICD-10-CM

## 2018-11-25 DIAGNOSIS — M54.9 DISCOMFORT OF BACK: ICD-10-CM

## 2018-11-25 DIAGNOSIS — J01.00 ACUTE NON-RECURRENT MAXILLARY SINUSITIS: ICD-10-CM

## 2018-11-25 DIAGNOSIS — M54.50 ACUTE BILATERAL LOW BACK PAIN WITHOUT SCIATICA: ICD-10-CM

## 2018-11-25 LAB
APPEARANCE UR: NORMAL
BILIRUB UR STRIP-MCNC: NEGATIVE MG/DL
COLOR UR AUTO: NORMAL
GLUCOSE UR STRIP.AUTO-MCNC: NEGATIVE MG/DL
INT CON NEG: NORMAL
INT CON POS: NORMAL
KETONES UR STRIP.AUTO-MCNC: NEGATIVE MG/DL
LEUKOCYTE ESTERASE UR QL STRIP.AUTO: 3
NITRITE UR QL STRIP.AUTO: POSITIVE
PH UR STRIP.AUTO: 7.5 [PH] (ref 5–8)
POC URINE PREGNANCY TEST: NEGATIVE
PROT UR QL STRIP: 2 MG/DL
RBC UR QL AUTO: 3
SP GR UR STRIP.AUTO: 1.02
UROBILINOGEN UR STRIP-MCNC: 0.2 MG/DL

## 2018-11-25 PROCEDURE — 81002 URINALYSIS NONAUTO W/O SCOPE: CPT | Performed by: NURSE PRACTITIONER

## 2018-11-25 PROCEDURE — 81025 URINE PREGNANCY TEST: CPT | Performed by: NURSE PRACTITIONER

## 2018-11-25 PROCEDURE — 87086 URINE CULTURE/COLONY COUNT: CPT

## 2018-11-25 PROCEDURE — 87077 CULTURE AEROBIC IDENTIFY: CPT

## 2018-11-25 PROCEDURE — 76775 US EXAM ABDO BACK WALL LIM: CPT

## 2018-11-25 PROCEDURE — 99214 OFFICE O/P EST MOD 30 MIN: CPT | Mod: 25 | Performed by: NURSE PRACTITIONER

## 2018-11-25 PROCEDURE — 87186 SC STD MICRODIL/AGAR DIL: CPT | Mod: 91

## 2018-11-25 RX ORDER — CEFUROXIME AXETIL 250 MG/1
250 TABLET ORAL 2 TIMES DAILY
Qty: 20 TAB | Refills: 0 | Status: SHIPPED | OUTPATIENT
Start: 2018-11-25 | End: 2018-12-05

## 2018-11-25 NOTE — PROGRESS NOTES
Chief Complaint   Patient presents with   • Cough     x 1 week// Dry cough// R ear ache and drainage    • Dysuria     x yesterday// slight blood when whipping// painful when urinating// frequent urinating        HISTORY OF PRESENT ILLNESS: Patient is a 23 y.o. female who presents today due to seven days of nasal congestion, fever, chills, headache, ear pain, dry cough, and sinus pressure. She denies associated difficulty breathing, confusion, nausea, vomiting or diarrhea. She has tried OTC cold/sinus medication at home without much improvement. Denies a history of asthma, pneumonia, seasonal allergies or sinus infections in the past. No known ill contacts at home. No recent antibiotic usage. In addition, she admits to two days of urinary urgency, frequency, hematuria, and dysuria. Denies flank pain but does report some intermittent, midline, lower back pain, sharp, which worsens with lying flat since onset. Denies history of kidney infections or stones.     Patient Active Problem List    Diagnosis Date Noted   • LGSIL on Pap smear of cervix 12/06/2017       Allergies:Patient has no known allergies.    Current Outpatient Prescriptions Ordered in [facility]   Medication Sig Dispense Refill   • ibuprofen (MOTRIN) 800 MG Tab Take 800 mg by mouth every 8 hours as needed.       No current Epic-ordered facility-administered medications on file.        History reviewed. No pertinent past medical history.    Social History   Substance Use Topics   • Smoking status: Never Smoker   • Smokeless tobacco: Never Used   • Alcohol use Yes       Family Status   Relation Status   • Mo Alive   • Fa Alive   • Bro Alive   • MAunt Alive   • MGMo (Not Specified)   • PGMo (Not Specified)   • Neg Hx (Not Specified)     Family History   Problem Relation Age of Onset   • Thyroid Mother    • No Known Problems Father    • Breast Cancer Maternal Aunt 39   • Hypertension Maternal Grandmother    • Heart Disease Paternal Grandmother    • Diabetes Neg Hx   "  • Depression Neg Hx    • Heart Attack Neg Hx    • Stroke Neg Hx        ROS:  Review of Systems   Constitutional: Positive for fever, chills, fatigue. Negative for weight loss.   HENT: Positive for ear pain, sinus pressure, sore throat, nasal congestion. Negative for ear pain, nosebleeds, neck pain.    Eyes: Negative for vision changes.   Cardiovascular: Negative for chest pain, palpitations, orthopnea and leg swelling.   Respiratory: Negative for cough, sputum production, shortness of breath and wheezing.   Gastrointestinal: Negative for abdominal pain, nausea, or diarrhea.    : Positive for dysuria, hematuria, frequency, urgency. Negative for flank pain.  MSK: Positive for lower midline back pain, non radiating, sharp.   Skin: Negative for rash, diaphoresis.     Exam:  Blood pressure 108/70, pulse 84, temperature 36.3 °C (97.3 °F), temperature source Temporal, resp. rate 18, height 1.511 m (4' 11.5\"), weight 47.2 kg (104 lb), SpO2 100 %, not currently breastfeeding.  General: well-nourished, well-developed female in NAD  Head: normocephalic, atraumatic  Eyes: PERRLA, no conjunctival injection, acuity grossly intact, lids normal.  Ears: normal shape and symmetry, no tenderness, no discharge. External canals are without any significant edema or erythema. Tympanic membranes are without any inflammation, no effusion. Gross auditory acuity is intact.  Nose: symmetrical without tenderness, erythema and swelling noted bilateral turbinates, clear discharge. Maxillary sinus tenderness.   Mouth/Throat: reasonable hygiene, no exudates or tonsillar enlargement. Erythema is present.   Neck: no masses, range of motion within normal limits, no tracheal deviation. No obvious thyroid enlargement.   Lymph: no cervical adenopathy. No supraclavicular adenopathy.   Neuro: alert and oriented. Cranial nerves 1-12 grossly intact. No sensory deficit.   Cardiovascular: regular rate and rhythm. No edema.  Pulmonary: no distress. Chest is " "symmetrical with respiration, no wheezes, crackles, or rhonchi.   Musculoskeletal: no clubbing, appropriate muscle tone, gait is stable. No midline spine tenderness.   GI: soft, normal appearance, suprapubic tenderness, no CVA tenderness.   Skin: warm, dry, intact, no clubbing, no cyanosis, no rashes.   Psych: appropriate mood, affect, judgement.         Assessment/Plan:  1. Acute non-recurrent maxillary sinusitis  cefUROXime (CEFTIN) 250 MG Tab   2. Acute UTI (urinary tract infection)  cefUROXime (CEFTIN) 250 MG Tab    POCT Urinalysis    POCT Pregnancy    URINE CULTURE(NEW)    US-RENAL   3. Discomfort of back  POCT Urinalysis    POCT Pregnancy    URINE CULTURE(NEW)    US-RENAL   4. Gross hematuria  POCT Urinalysis    POCT Pregnancy    URINE CULTURE(NEW)    US-RENAL       US renal radiology reading \"1.  There is minimal dilatation of the right mid and superior pole calyces.  2.  There is no significant hydronephrosis and there are no renal calcifications seen.\"          Ceftin as directed for both sinusitis and cysitis, potential side effects of medication discussed. Probiotic use encouraged. Nasal washes with sterile saline solution daily. Sleep with HOB elevated, humidifier at night, rest, increase fluid intake. Her US is negative for stone or hydronephrosis, she is instructed to follow up with her PCP regarding. Urine sent for culture.   Supportive care, differential diagnoses, and indications for immediate follow-up discussed with patient.   Pathogenesis of diagnosis discussed including typical length and natural progression.   Instructed to return to clinic or nearest emergency department for any change in condition, further concerns, or worsening of symptoms.  Patient states understanding of the plan of care and discharge instructions.  Instructed to make an appointment, for follow up, with her primary care provider.        Please note that this dictation was created using voice recognition software. I have " made every reasonable attempt to correct obvious errors, but I expect that there are errors of grammar and possibly content that I did not discover before finalizing the note.      MIKE Lynn.

## 2018-11-26 ENCOUNTER — OFFICE VISIT (OUTPATIENT)
Dept: MEDICAL GROUP | Facility: PHYSICIAN GROUP | Age: 23
End: 2018-11-26
Payer: COMMERCIAL

## 2018-11-26 VITALS
BODY MASS INDEX: 20.81 KG/M2 | HEIGHT: 60 IN | OXYGEN SATURATION: 99 % | DIASTOLIC BLOOD PRESSURE: 64 MMHG | TEMPERATURE: 98.2 F | WEIGHT: 106 LBS | HEART RATE: 62 BPM | RESPIRATION RATE: 18 BRPM | SYSTOLIC BLOOD PRESSURE: 112 MMHG

## 2018-11-26 DIAGNOSIS — N39.0 ACUTE UTI (URINARY TRACT INFECTION): ICD-10-CM

## 2018-11-26 DIAGNOSIS — M54.9 DISCOMFORT OF BACK: ICD-10-CM

## 2018-11-26 DIAGNOSIS — R31.0 GROSS HEMATURIA: ICD-10-CM

## 2018-11-26 DIAGNOSIS — N30.01 ACUTE CYSTITIS WITH HEMATURIA: ICD-10-CM

## 2018-11-26 DIAGNOSIS — R10.9 RIGHT FLANK PAIN: ICD-10-CM

## 2018-11-26 PROCEDURE — 99213 OFFICE O/P EST LOW 20 MIN: CPT | Performed by: FAMILY MEDICINE

## 2018-11-26 ASSESSMENT — PATIENT HEALTH QUESTIONNAIRE - PHQ9: CLINICAL INTERPRETATION OF PHQ2 SCORE: 0

## 2018-11-26 NOTE — PROGRESS NOTES
"Subjective:   Sandra Rouse is a 23 y.o. female here today for follow-up urgent care visit, ultrasound results and UTI.  She is unaccompanied for today's visit.    Patient was seen in urgent care yesterday for dysuria, right flank pain and nasal congestion.  She was given an antibiotic to treat both a sinus infection and a UTI.  Today, she tells me that she is feeling better.  She is worried about the findings of her ultrasound.  Yesterday, while at work, she noticed 2 clots in her urine.  She has some \"soreness\" in her right side.  Denies fever, chills, nausea or vomiting.  She has been drinking a lot of water and has a good appetite.    Current medicines (including changes today)  Current Outpatient Prescriptions   Medication Sig Dispense Refill   • cefUROXime (CEFTIN) 250 MG Tab Take 1 Tab by mouth 2 times a day for 10 days. 20 Tab 0   • ibuprofen (MOTRIN) 800 MG Tab Take 800 mg by mouth every 8 hours as needed.       No current facility-administered medications for this visit.      She  has no past medical history of Asthma or Migraine.    ROS   No chest pain, no shortness of breath, no abdominal pain.     Objective:     Physical Exam:  Blood pressure 112/64, pulse 62, temperature 36.8 °C (98.2 °F), temperature source Temporal, resp. rate 18, height 1.511 m (4' 11.5\"), weight 48.1 kg (106 lb), SpO2 99 %, not currently breastfeeding. Body mass index is 21.05 kg/m².   Constitutional: Alert, no distress, non-toxic appearance.  Skin: Warm, dry, good turgor, no rashes in visible areas.  Eye: Equal, round and reactive, conjunctiva clear, lids normal.  ENMT: Lips without lesions, good dentition, oropharynx clear.  Neck: Trachea midline, no masses, no thyromegaly. No cervical or supraclavicular lymphadenopathy.  Respiratory: Unlabored respiratory effort, lungs clear to auscultation, no wheezes, no rhonchi.  Cardiovascular: Normal S1, S2, no murmur, no edema.  Abdomen: Soft, mildly tender to deep palpation in " suprapubic area, no masses, no hepatosplenomegaly. No CVAT.  Psych: Alert and oriented x3, normal affect and mood.    Assessment and Plan:     1. Acute cystitis with hematuria  2. Right flank pain  Improving.  Patient is on antibiotics to treat both a sinus infection and her UTI.  We reviewed the results of her ultrasound together.  I reassured her that the mild dilation of the right mid and superior polar calyces could be due to infection or a small kidney stone.  Her vital signs and exam are essentially normal.  I let her know that the results of her urine culture would be available to us later this week and we would change her treatment regimen if needed.  I advised her to contact our office if she is not feeling better in 1-2 weeks.  If she continues to have right flank pain and hematuria, I would consider referring her to a urologist.    Followup: Return if symptoms worsen or fail to improve.         PLEASE NOTE: This dictation was created using voice recognition software. I have made every reasonable attempt to correct obvious errors, but I expect that there are errors of grammar and possibly content that I did not discover before finalizing the note.

## 2018-11-28 DIAGNOSIS — N39.0 COMPLICATED URINARY TRACT INFECTION: ICD-10-CM

## 2018-11-28 LAB
BACTERIA UR CULT: ABNORMAL
SIGNIFICANT IND 70042: ABNORMAL
SITE SITE: ABNORMAL
SOURCE SOURCE: ABNORMAL

## 2018-11-28 RX ORDER — NITROFURANTOIN 25; 75 MG/1; MG/1
100 CAPSULE ORAL 2 TIMES DAILY
Qty: 14 CAP | Refills: 0 | Status: SHIPPED | OUTPATIENT
Start: 2018-11-28 | End: 2018-12-05

## 2018-11-29 ENCOUNTER — TELEPHONE (OUTPATIENT)
Dept: URGENT CARE | Facility: CLINIC | Age: 23
End: 2018-11-29

## 2018-11-29 NOTE — TELEPHONE ENCOUNTER
Caller Name: Sandra Rouse  Call Back Number: [phone]       Retuning missed call from MYRTLE Lynn

## 2018-12-01 DIAGNOSIS — B37.9 ANTIBIOTIC-INDUCED YEAST INFECTION: ICD-10-CM

## 2018-12-01 DIAGNOSIS — T36.95XA ANTIBIOTIC-INDUCED YEAST INFECTION: ICD-10-CM

## 2018-12-01 RX ORDER — FLUCONAZOLE 150 MG/1
150 TABLET ORAL DAILY
Qty: 1 TAB | Refills: 0 | Status: SHIPPED | OUTPATIENT
Start: 2018-12-01 | End: 2021-05-06

## 2019-02-04 ENCOUNTER — GYNECOLOGY VISIT (OUTPATIENT)
Dept: OBGYN | Facility: MEDICAL CENTER | Age: 24
End: 2019-02-04
Payer: COMMERCIAL

## 2019-02-04 ENCOUNTER — HOSPITAL ENCOUNTER (OUTPATIENT)
Dept: LAB | Facility: MEDICAL CENTER | Age: 24
End: 2019-02-04
Attending: NURSE PRACTITIONER
Payer: COMMERCIAL

## 2019-02-04 ENCOUNTER — HOSPITAL ENCOUNTER (OUTPATIENT)
Facility: MEDICAL CENTER | Age: 24
End: 2019-02-04
Attending: NURSE PRACTITIONER
Payer: COMMERCIAL

## 2019-02-04 VITALS
HEIGHT: 60 IN | BODY MASS INDEX: 21.6 KG/M2 | DIASTOLIC BLOOD PRESSURE: 60 MMHG | SYSTOLIC BLOOD PRESSURE: 105 MMHG | WEIGHT: 110 LBS

## 2019-02-04 DIAGNOSIS — Z01.419 WELL WOMAN EXAM WITH ROUTINE GYNECOLOGICAL EXAM: Primary | ICD-10-CM

## 2019-02-04 DIAGNOSIS — Z12.4 PAP SMEAR FOR CERVICAL CANCER SCREENING: ICD-10-CM

## 2019-02-04 DIAGNOSIS — Z01.419 WELL WOMAN EXAM WITH ROUTINE GYNECOLOGICAL EXAM: ICD-10-CM

## 2019-02-04 DIAGNOSIS — Z30.09 ENCOUNTER FOR CONTRACEPTIVE PLANNING: ICD-10-CM

## 2019-02-04 DIAGNOSIS — Z11.3 SCREENING FOR STDS (SEXUALLY TRANSMITTED DISEASES): ICD-10-CM

## 2019-02-04 LAB
HAV IGM SERPL QL IA: NEGATIVE
HBV CORE IGM SER QL: NEGATIVE
HBV SURFACE AG SER QL: NEGATIVE
HCV AB SER QL: NEGATIVE
HIV 1+2 AB+HIV1 P24 AG SERPL QL IA: NON REACTIVE
TREPONEMA PALLIDUM IGG+IGM AB [PRESENCE] IN SERUM OR PLASMA BY IMMUNOASSAY: NON REACTIVE

## 2019-02-04 PROCEDURE — 36415 COLL VENOUS BLD VENIPUNCTURE: CPT

## 2019-02-04 PROCEDURE — 88175 CYTOPATH C/V AUTO FLUID REDO: CPT

## 2019-02-04 PROCEDURE — 87389 HIV-1 AG W/HIV-1&-2 AB AG IA: CPT

## 2019-02-04 PROCEDURE — 99395 PREV VISIT EST AGE 18-39: CPT | Performed by: NURSE PRACTITIONER

## 2019-02-04 PROCEDURE — 87491 CHLMYD TRACH DNA AMP PROBE: CPT

## 2019-02-04 PROCEDURE — 87591 N.GONORRHOEAE DNA AMP PROB: CPT

## 2019-02-04 PROCEDURE — 80074 ACUTE HEPATITIS PANEL: CPT

## 2019-02-04 PROCEDURE — 86780 TREPONEMA PALLIDUM: CPT

## 2019-02-04 NOTE — PROGRESS NOTES
Sandra Rouse is a 23 y.o. y.o. female who presents for her Gynecologic Exam        HPI Comments: Pt presents for well woman exam. Pt has no complaints today. She desires annual exam, std testing and wants to discuss IUD for contraception. Was recently seen for UTI possible stone at urgent care. Was to be referred if sx not resolved. Patient's last menstrual period was 01/25/2019. Her menses are described as heavy for the first 7 days and light for another 3 days lasting a total of 7 days. Was on OCP and stopped about a year ago. The OCP was making her nauseated and gain weight. She was using condoms for contraception but really started having vaginal burning. Suspects allergy to latex because she is in school and hands itch with latex gloves. She is currently using a withdrawal method and is sexually active with one partner. She describes intercourse as painful. There are certain positions more comfortable than others. She states she is safe in her relationship. She describes her exercise habits as good she goes to the gym 3 days a week. She describes her nutrition as good. She has a history of bulimia. She is not seeing anyone at this time. She is stable on mental health. She states rare use etoh, no drugs or cigarettes. She states no use of alternative medicines or therapies. She describes bowel and bladder habits as normal. Her primary care is Mike Segundo.   .  Review of Systems   Pertinent positives documented in HPI and all other systems reviewed & are negative    All PMH, PSH, allergies, social history and FH reviewed and updated today:  History reviewed. No pertinent past medical history.  History reviewed. No pertinent surgical history.  Latex  Social History     Social History   • Marital status: Single     Spouse name: N/A   • Number of children: N/A   • Years of education: N/A     Social History Main Topics   • Smoking status: Never Smoker   • Smokeless tobacco: Never Used   • Alcohol use Yes   •  "Drug use: No   • Sexual activity: Yes     Partners: Male     Birth control/ protection: Condom     Other Topics Concern   • Not on file     Social History Narrative    ** Merged History Encounter **         ** Data from: 5/17/16 [facility] Dept: MED [facility]         ** Data from: 3/29/17 [facility] Dept: [facility]    Patient is single, lives with her family. In school full time, not working      Family History   Problem Relation Age of Onset   • Thyroid Mother    • No Known Problems Father    • Breast Cancer Maternal Aunt 39   • Hypertension Maternal Grandmother    • Heart Disease Paternal Grandmother    • Diabetes Neg Hx    • Depression Neg Hx    • Heart Attack Neg Hx    • Stroke Neg Hx      Medications:   Current Outpatient Prescriptions [facility] in [facility]   Medication Sig Dispense Refill   • fluconazole (DIFLUCAN) 150 MG tablet Take 1 Tab by mouth every day. (Patient not taking: Reported on 2/4/2019) 1 Tab 0   • ibuprofen (MOTRIN) 800 MG Tab Take 800 mg by mouth every 8 hours as needed.       No current Epic-ordered facility-administered medications on file.           Objective:   Vital measurements:  Blood pressure 105/60, height 1.511 m (4' 11.5\"), weight 49.9 kg (110 lb), last menstrual period 01/25/2019, not currently breastfeeding.  Body mass index is 21.85 kg/m². (Goal BM I>18 <25)    Physical Exam   Nursing note and vitals reviewed.  Constitutional: She is oriented to person, place, and time. She appears well-developed and well-nourished. No distress.     HEENT:   Head: Normocephalic and atraumatic.   Right Ear: External ear normal.   Left Ear: External ear normal.   Nose: Nose normal.   Eyes: Conjunctivae and EOM are normal. Pupils are equal, round, and reactive to light. No scleral icterus.     Neck: Normal range of motion. Neck supple. No tracheal deviation present. No thyromegaly present.     Pulmonary/Chest: Effort normal and breath sounds normal. No respiratory distress. She has no wheezes. She has " no rales. She exhibits no tenderness.     Cardiovascular: Regular, rate and rhythm. No JVD.    Abdominal: Soft. Bowel sounds are normal. She exhibits no distension and no mass. No tenderness. She has no rebound and no guarding.     Breast:  Symmetrical, normal consistency without masses., No dimpling or skin changes, Normal nipples without discharge, no axillary lymphadenopathy, Inspection negative. No nipple discharge or bleeding    Genitourinary:  Pelvic exam was performed with patient supine.  External genitalia with no abnormal pigmentation, labial fusion,rash, tenderness, lesion or injury to the labia bilaterally.  Vagina is moist with no lesions, foul discharge, erythema, tenderness or bleeding. No foreign body around the vagina or signs of injury.   Cervix exhibits no motion tenderness, no discharge and no friability.   Uterus is anteverted not deviated, not enlarged, not fixed and not tender.  Right adnexum displays no mass, no tenderness and no fullness. Left adnexum displays no mass, no tenderness and no fullness.     Musculoskeletal: Normal range of motion. She exhibits no edema and no tenderness.     Lymphadenopathy: She has no cervical adenopathy.     Neurological: She is alert and oriented to person, place, and time. She exhibits normal muscle tone.     Skin: Skin is warm and dry. No rash noted. She is not diaphoretic. No erythema. No pallor.     Psychiatric: She has a normal mood and affect. Her behavior is normal. Judgment and thought content normal.               Assessment:     1. Well woman exam with routine gynecological exam  THINPREP RFLX HPV ASCUS W/CTNG   2. Pap smear for cervical cancer screening  THINPREP RFLX HPV ASCUS W/CTNG   3. Screening for STDs (sexually transmitted diseases)  THINPREP RFLX HPV ASCUS W/CTNG         Plan:   Pap and physical exam performed  Monthly SBE.  Counseling: breast self exam, STD prevention, HIV risk factors and prevention, family planning choices and adequate  intake of calcium and vitamin D  Encourage exercise and proper diet.  Mammograms starting @ age 40 annually.  See medications and orders placed in encounter report.  Psych/soc or other referral needs - patient stable with no referrals needed  Preventative care referrals needed - continue with PCP  Contraceptive options for unwanted pregnancy all options discussed patient desires IUD. Dolly consented for.   Alcohol/drug/tobacco counseling and referrals - none needed  STD protection  - discussed IUD will not protect from STDs Patient states understanding.   STD screening - desires and done today.   Bone mineral density testing none needed  Diabetes testing - none needed, followed by PCP  Well woman labs  - followed by PCP  Folic acid for all women of childbearing age

## 2019-02-04 NOTE — PROGRESS NOTES
Pt presents for annual exam and to discuss bc options. Pt stopped ocp's approx 1 year ago and now is having menses that last 10 days.Pt denies increase in flow or pain/cramping. Pt has questions about an IUD.  PH#[phone]  PHARM# [facility]

## 2019-02-05 DIAGNOSIS — Z01.419 WELL WOMAN EXAM WITH ROUTINE GYNECOLOGICAL EXAM: ICD-10-CM

## 2019-02-05 DIAGNOSIS — Z12.4 PAP SMEAR FOR CERVICAL CANCER SCREENING: ICD-10-CM

## 2019-02-05 DIAGNOSIS — Z11.3 SCREENING FOR STDS (SEXUALLY TRANSMITTED DISEASES): ICD-10-CM

## 2019-02-22 ENCOUNTER — TELEPHONE (OUTPATIENT)
Dept: OBGYN | Facility: CLINIC | Age: 24
End: 2019-02-22

## 2019-02-22 NOTE — TELEPHONE ENCOUNTER
Pt called requesting IUD status,  I spoke w/pt a notified her that it can take couple weeks the whole process we will call her back to set up an appt for placement.  Verbalized understanding.

## 2019-03-18 ENCOUNTER — GYNECOLOGY VISIT (OUTPATIENT)
Dept: OBGYN | Facility: MEDICAL CENTER | Age: 24
End: 2019-03-18
Payer: COMMERCIAL

## 2019-03-18 VITALS
SYSTOLIC BLOOD PRESSURE: 110 MMHG | HEIGHT: 59 IN | WEIGHT: 115 LBS | DIASTOLIC BLOOD PRESSURE: 56 MMHG | BODY MASS INDEX: 23.18 KG/M2

## 2019-03-18 DIAGNOSIS — Z30.430 ENCOUNTER FOR IUD INSERTION: ICD-10-CM

## 2019-03-18 LAB
INT CON NEG: NEGATIVE
INT CON POS: POSITIVE
POC URINE PREGNANCY TEST: NEGATIVE

## 2019-03-18 PROCEDURE — 81025 URINE PREGNANCY TEST: CPT | Performed by: NURSE PRACTITIONER

## 2019-03-18 NOTE — PROGRESS NOTES
IUD: rocky    Today the patient is counseled on the risks of IUD insertion. Specifically discussed were alternative forms of birth control. I also discussed with the patient the risk of infection on insertion, and had asked the patient to remain on pelvic rest for one week following the insertion. We also discussed the risk of IUD expulsion, the risk of uterine perforation and IUD migration. If the IUD does migrate the patient may require a separate procedure such as a laparoscopy to retrieve the migrated IUD. I also discussed the 1% risk of pregnancy with IUD use. I also discussed the side effects of rocky IUD which can be amenorrhea or dysfunctional uterine bleeding or spotting.  Patient had the opportunity to ask questions regarding insertion, risks and benefits, all questions are answered in their entirety.  Informed consent is signed    Procedure note  Urine pregnancy test is negative, informed consent was previously signed  The bimanual exam is performed the uterus is noted to be retroflexed and to the patients left.   A speculum was inserted into the vagina, the cervix was cleansed with Betadine swabs x3  Tenaculum was placed on the anterior lip of the cervix at 11:00 and 1:00   The uterus was sounded to 7 centimeters  The IUD is placed under sterile conditions:   The strings trimmed to approximately 3 cm  Tenaculum was removed from the cervix and hemostasis was achieved with pressure and application of silver nitrate.   The patient tolerated the procedure well      Lot:[id number]  Exp: 2/21      Patient is asked to followup in 4-6 weeks for IUD check. The patient is asked to remain on pelvic rest for 2-3 days.  Use a backup method for 7 days, condoms. She is asked to return sooner than 4-6 weeks for heavy vaginal bleeding uncontrolled pain or fever

## 2019-04-23 ENCOUNTER — GYNECOLOGY VISIT (OUTPATIENT)
Dept: OBGYN | Facility: MEDICAL CENTER | Age: 24
End: 2019-04-23
Payer: COMMERCIAL

## 2019-04-23 VITALS
DIASTOLIC BLOOD PRESSURE: 56 MMHG | BODY MASS INDEX: 26.15 KG/M2 | HEIGHT: 55 IN | SYSTOLIC BLOOD PRESSURE: 102 MMHG | WEIGHT: 113 LBS

## 2019-04-23 DIAGNOSIS — Z30.431 IUD CHECK UP: ICD-10-CM

## 2019-04-23 PROCEDURE — 99213 OFFICE O/P EST LOW 20 MIN: CPT | Performed by: ADVANCED PRACTICE MIDWIFE

## 2019-04-23 NOTE — PROGRESS NOTES
".  Chief Complaint   Patient presents with   • Gynecologic Exam     String check       History of present illness: 23 y.o. presents with above chief complaint. Pt had Mirena IUD placed without any complications and presents for an IUD check up. She reports irregular bleeding, no pain, no discomfort with intercourse, no discharge. Denies fever, chills, nausea, vomiting. Overall very happy with device.    Review of systems:  Pertinent positives documented in HPI and all other systems reviewed & are negative      All PMH, PSH, allergies, social history and FH reviewed and updated today:  No past medical history on file.    No past surgical history on file.    Allergies:   Allergies   Allergen Reactions   • Latex        Social History     Social History   • Marital status: Single     Spouse name: N/A   • Number of children: N/A   • Years of education: N/A     Occupational History   • Not on file.     Social History Main Topics   • Smoking status: Never Smoker   • Smokeless tobacco: Never Used   • Alcohol use Yes   • Drug use: No   • Sexual activity: Yes     Partners: Male     Birth control/ protection: Condom     Other Topics Concern   • Not on file     Social History Narrative    ** Merged History Encounter **         ** Data from: 5/17/16 [facility] Dept[facility]         ** Data from: 3/29/17 [facility]: [facility]    Patient is single, lives with her family. In school full time, not working        Family History   Problem Relation Age of Onset   • Thyroid Mother    • No Known Problems Father    • Breast Cancer Maternal Aunt 39   • Hypertension Maternal Grandmother    • Heart Disease Paternal Grandmother    • Diabetes Neg Hx    • Depression Neg Hx    • Heart Attack Neg Hx    • Stroke Neg Hx        Physical exam:  /56   Ht 1.245 m (4' 1\")   Wt 51.3 kg (113 lb)     GENERAL APPEARANCE: healthy, alert, no distress, cooperative, smiling  NECK nontender, no masses, thyromegaly or nodules  ABDOMEN Abdomen " soft, non-tender. BS normal. No masses,  No organomegaly  FEMALE GYN: normal female external genitalia without lesions, brown vaginal discharge noted, vulva pink without erythema or friability, urethra is normal without discharge or scarring, no bladder fullness or masses, normal vagina and normal vaginal tone, normal cervix, normal  uterus, size and consistency, IUD strings seen and palpated with normal length of strings, normal anus and perineum.  EXTREMITIES:negative clubbing, cyanosis, edema    NEURO Awake, alert and oriented x 3, Normal gait, no sensory deficits  SKIN No rashes, or ulcers or lesions seen  PSYCHIATRIC: Patient shows appropriate affect, is alert and oriented x3, intact judgment and insight.      1. IUD check up       IUD in appropriate location    Spent  15 minutes in face-to-face patient contact in which greater than 50% of that visit was spent in counseling/coordination of care of IUD and normal menstrual irregularity side effects. Reminded patient to check for strings monthly and to call the office if IUD has fallen out or any other problems should arise. Also reminded patient IUD expires in 5 years.  Follow up yearly for annual exam or sooner as needed.

## 2020-02-18 ENCOUNTER — HOSPITAL ENCOUNTER (OUTPATIENT)
Dept: RADIOLOGY | Facility: MEDICAL CENTER | Age: 25
End: 2020-02-18
Attending: NURSE PRACTITIONER
Payer: COMMERCIAL

## 2020-02-18 ENCOUNTER — OFFICE VISIT (OUTPATIENT)
Dept: URGENT CARE | Facility: PHYSICIAN GROUP | Age: 25
End: 2020-02-18
Payer: COMMERCIAL

## 2020-02-18 VITALS
TEMPERATURE: 98.5 F | SYSTOLIC BLOOD PRESSURE: 108 MMHG | HEART RATE: 60 BPM | OXYGEN SATURATION: 100 % | DIASTOLIC BLOOD PRESSURE: 74 MMHG | WEIGHT: 122 LBS | RESPIRATION RATE: 16 BRPM | BODY MASS INDEX: 24.6 KG/M2 | HEIGHT: 59 IN

## 2020-02-18 DIAGNOSIS — R10.31 RLQ ABDOMINAL PAIN: ICD-10-CM

## 2020-02-18 DIAGNOSIS — R11.0 NAUSEA: ICD-10-CM

## 2020-02-18 DIAGNOSIS — N83.201 CYST OF RIGHT OVARY: Primary | ICD-10-CM

## 2020-02-18 LAB
APPEARANCE UR: CLEAR
BILIRUB UR STRIP-MCNC: NEGATIVE MG/DL
COLOR UR AUTO: YELLOW
GLUCOSE UR STRIP.AUTO-MCNC: NEGATIVE MG/DL
INT CON NEG: NEGATIVE
INT CON POS: POSITIVE
KETONES UR STRIP.AUTO-MCNC: NEGATIVE MG/DL
LEUKOCYTE ESTERASE UR QL STRIP.AUTO: NEGATIVE
NITRITE UR QL STRIP.AUTO: NEGATIVE
PH UR STRIP.AUTO: 6.5 [PH] (ref 5–8)
POC URINE PREGNANCY TEST: NEGATIVE
PROT UR QL STRIP: NEGATIVE MG/DL
RBC UR QL AUTO: NORMAL
SP GR UR STRIP.AUTO: 1.03
UROBILINOGEN UR STRIP-MCNC: 0.2 MG/DL

## 2020-02-18 PROCEDURE — 76830 TRANSVAGINAL US NON-OB: CPT

## 2020-02-18 PROCEDURE — 81002 URINALYSIS NONAUTO W/O SCOPE: CPT | Performed by: NURSE PRACTITIONER

## 2020-02-18 PROCEDURE — 99214 OFFICE O/P EST MOD 30 MIN: CPT | Performed by: NURSE PRACTITIONER

## 2020-02-18 PROCEDURE — 81025 URINE PREGNANCY TEST: CPT | Performed by: NURSE PRACTITIONER

## 2020-02-18 RX ORDER — ONDANSETRON 4 MG/1
4 TABLET, ORALLY DISINTEGRATING ORAL ONCE
Status: COMPLETED | OUTPATIENT
Start: 2020-02-18 | End: 2020-02-18

## 2020-02-18 RX ADMIN — ONDANSETRON 4 MG: 4 TABLET, ORALLY DISINTEGRATING ORAL at 12:05

## 2020-02-18 ASSESSMENT — ENCOUNTER SYMPTOMS
CONSTIPATION: 0
BELCHING: 0
DIARRHEA: 0
FLATUS: 0
ARTHRALGIAS: 0
ABDOMINAL PAIN: 1
VOMITING: 1
NAUSEA: 1
MYALGIAS: 0
WEIGHT LOSS: 0
ANOREXIA: 1

## 2020-02-18 NOTE — LETTER
February 18, 2020       Patient: Sandra Rouse   YOB: 1995   Date of Visit: 2/18/2020         To Whom It May Concern:    It is my medical opinion that Sandra Rouse return to full duty, no restrictions on 02/19/2020. Please excuse her absence due to a medical illness.               Sincerely,          BIBI SantanaPTahirN.  Electronically Signed

## 2020-02-18 NOTE — PROGRESS NOTES
"Subjective:      Sandra Rouse is a 24 y.o. female who presents with Abdominal Pain (right lower abd pain, onset this am)    Reviewed past medical, surgical and family history. Reviewed prescription and OTC medications with patient in electronic health record today      Allergies   Allergen Reactions   • Latex            LMP: Triston 10 - 18   BCM: IUD Dolly    Abdominal Pain   This is a new problem. The current episode started today. The onset quality is sudden. The quality of the pain is cramping. Associated symptoms include anorexia (started yesterday), nausea and vomiting. Pertinent negatives include no arthralgias, belching, constipation, diarrhea, dysuria, flatus, frequency, hematuria, melena, myalgias or weight loss. Associated symptoms comments: Fatigue  .       Review of Systems   Constitutional: Negative for weight loss.   Gastrointestinal: Positive for abdominal pain, anorexia (started yesterday), nausea and vomiting. Negative for constipation, diarrhea, flatus and melena.   Genitourinary: Negative for dysuria, frequency and hematuria.   Musculoskeletal: Negative for arthralgias and myalgias.          Objective:     /74   Pulse 60   Temp 36.9 °C (98.5 °F)   Resp 16   Ht 1.499 m (4' 11\")   Wt 55.3 kg (122 lb)   SpO2 100%   BMI 24.64 kg/m²      Physical Exam  Vitals signs and nursing note reviewed.   Constitutional:       General: She is in acute distress.      Appearance: Normal appearance. She is well-developed. She is not toxic-appearing.   HENT:      Head: Normocephalic.      Right Ear: Hearing normal.      Left Ear: Hearing normal.      Mouth/Throat:      Pharynx: Uvula midline.   Eyes:      General: Lids are normal.      Pupils: Pupils are equal, round, and reactive to light.   Neck:      Musculoskeletal: Full passive range of motion without pain and normal range of motion.      Trachea: Trachea and phonation normal.   Cardiovascular:      Rate and Rhythm: Normal rate and regular " rhythm.   Pulmonary:      Effort: Pulmonary effort is normal.      Breath sounds: Normal breath sounds.   Abdominal:      General: Abdomen is flat.      Palpations: Abdomen is soft.      Tenderness: There is abdominal tenderness in the right lower quadrant. There is no right CVA tenderness, left CVA tenderness, guarding or rebound. Negative signs include Nunes's sign, Rovsing's sign, McBurney's sign, psoas sign and obturator sign.   Lymphadenopathy:      Lower Body: No right inguinal adenopathy. No left inguinal adenopathy.   Skin:     General: Skin is warm and dry.      Findings: No rash.   Neurological:      Mental Status: She is alert and oriented to person, place, and time.   Psychiatric:         Speech: Speech normal.         Behavior: Behavior normal. Behavior is cooperative.            2/18/2020 3:32 PM     HISTORY/REASON FOR EXAM:  Right-sided pelvic pain        TECHNIQUE/EXAM DESCRIPTION:  Transvaginal pelvic ultrasound.     COMPARISON:   None     FINDINGS:  Transvaginal scanning was performed.     The uterus measures 3.08 cm x 6.90 cm x 4.22 cm.  The uterine myometrium is within normal limits.  The endometrial echo complex measures 0.53 cm. IUD is noted centrally in the endometrial cavity.     Low resistive waveforms are confirmed within the ovaries.  The right ovary measures 3.17 cm x 1.83 cm x 2.50 cm.  Small residual right ovarian cyst measures 2.4 x 2.0 x 1.1 cm.  The left ovary measures 2.95 cm x 1.24 cm x 2.11 cm.     Tubular structure in right lower quadrant likely represents normal appendix measuring 5 mm in diameter.     Small amount of free fluid is identified.     IMPRESSION:     1.  Small amount of free fluid identified.     2.  Small right ovarian cyst is noted which is likely physiologic.     3.  IUD is noted in the endometrial cavity.             Last Resulted: 02/18/20  4:27 PM        Results for orders placed or performed in visit on 02/18/20   POCT Urinalysis   Result Value Ref Range     POC Color Yellow Negative    POC Appearance Clear Negative    POC Leukocyte Esterase Negative Negative    POC Nitrites Negative Negative    POC Urobiligen 0.2 Negative (0.2) mg/dL    POC Protein Negative Negative mg/dL    POC Urine PH 6.5 5.0 - 8.0    POC Blood Trace Negative    POC Specific Gravity 1.030 <1.005 - >1.030    POC Ketones Negative Negative mg/dL    POC Bilirubin Negative Negative mg/dL    POC Glucose Negative Negative mg/dL   POCT Pregnancy   Result Value Ref Range    POC Urine Pregnancy Test Negative Negative    Internal Control Positive Positive     Internal Control Negative Negative           Assessment/Plan:       1. Cyst of right ovary     2. RLQ abdominal pain  POCT Urinalysis    POCT Pregnancy    US-PELVIC TRANSVAGINAL ONLY   3. Nausea  ondansetron (ZOFRAN ODT) dispertab 4 mg     FU gyn 3-4 weeks  OTC  analgesic of choice. Follow manufactures dosing and safety precautions.   Warm packs prn pain   Activity as tolerated.   Pt notified of test results by phone message. Call prn questions.   Return to urgent care clinic or PCP  4-5  days if current symptoms are not resolving in a satisfactory manner or sooner if new or worsening symptoms occur. Differential diagnosis, natural history, supportive care, and indications for immediate follow-up discussed at length.   Advised of signs and symptoms which would warrant further evaluation and /or emergent evaluation in ER.    Verbalized agreement with this treatment plan and seemed to understand without barriers. Questions were encouraged and answered to satisfaction.

## 2020-02-21 ENCOUNTER — GYNECOLOGY VISIT (OUTPATIENT)
Dept: OBGYN | Facility: CLINIC | Age: 25
End: 2020-02-21
Payer: COMMERCIAL

## 2020-02-21 VITALS — SYSTOLIC BLOOD PRESSURE: 125 MMHG | WEIGHT: 119 LBS | DIASTOLIC BLOOD PRESSURE: 80 MMHG | BODY MASS INDEX: 24.04 KG/M2

## 2020-02-21 DIAGNOSIS — N83.201 CYST OF RIGHT OVARY: ICD-10-CM

## 2020-02-21 DIAGNOSIS — Z97.5 IUD (INTRAUTERINE DEVICE) IN PLACE: ICD-10-CM

## 2020-02-21 DIAGNOSIS — R10.2 PELVIC PAIN: ICD-10-CM

## 2020-02-21 PROBLEM — R87.612 LGSIL ON PAP SMEAR OF CERVIX: Status: RESOLVED | Noted: 2017-12-06 | Resolved: 2020-02-21

## 2020-02-21 PROCEDURE — 99213 OFFICE O/P EST LOW 20 MIN: CPT | Performed by: OBSTETRICS & GYNECOLOGY

## 2020-02-21 NOTE — PROGRESS NOTES
Subjective:      Sandra Rouse is a 24 y.o. female who presents for follow-up from urgent care            HPI patient is a 24-year-old  0 who presents today for follow-up from urgent care.  Patient states 3 days ago,  she woke up with significant right lower quadrant and lower pelvic pain prompting visit to urgent care where she had ultrasound done and was told to follow-up with GYN.  Patient states her pain has significantly improved every day since her urgent care visit and she is not using any pain medication.  Patient reports no abnormal bleeding.  Reports normal bowel and bladder functions.  Patient has a Mirena IUD has had amenorrhea since.  She states she is very happy with IUD.    She had one abnormal Pap smear in 2017 abnormal Pap smear last year.  Pap smear screening recommendations were reviewed    ROS all organ systems were reviewed and were negative except for complaints in HPI       Objective:   VSS, AF    Physical Exam  Vitals signs and nursing note reviewed. Exam conducted with a chaperone present.   Constitutional:       General: She is not in acute distress.     Appearance: Normal appearance. She is normal weight. She is not toxic-appearing.   HENT:      Head: Normocephalic and atraumatic.   Eyes:      General:         Right eye: No discharge.         Left eye: No discharge.      Conjunctiva/sclera: Conjunctivae normal.   Neck:      Musculoskeletal: Normal range of motion and neck supple. No neck rigidity.   Cardiovascular:      Rate and Rhythm: Normal rate and regular rhythm.      Pulses: Normal pulses.      Heart sounds: Normal heart sounds. No murmur. No gallop.    Pulmonary:      Effort: Pulmonary effort is normal. No respiratory distress.      Breath sounds: Normal breath sounds. No wheezing.   Abdominal:      General: Abdomen is flat. Bowel sounds are normal. There is no distension.      Palpations: Abdomen is soft. There is no mass.      Tenderness: There is abdominal tenderness  (Mild tenderness to palpation right lower quadrant of abdomen). There is no right CVA tenderness, left CVA tenderness, guarding or rebound.      Hernia: No hernia is present.   Musculoskeletal: Normal range of motion.      Right lower leg: No edema.      Left lower leg: No edema.   Lymphadenopathy:      Cervical: No cervical adenopathy.   Skin:     General: Skin is warm and dry.      Coloration: Skin is not jaundiced.   Neurological:      General: No focal deficit present.      Mental Status: She is alert and oriented to person, place, and time.      Gait: Gait normal.   Psychiatric:         Mood and Affect: Mood normal.         Behavior: Behavior normal.         Thought Content: Thought content normal.         Judgment: Judgment normal.          Discussion:    Ultrasound findings were discussed with patient.  I discussed that she likely had a ruptured ovarian cyst and symptoms are currently improving daily.  IUD is in normal location.  Patient was reassured.    /18/2020 3:32 PM     HISTORY/REASON FOR EXAM:  Right-sided pelvic pain        TECHNIQUE/EXAM DESCRIPTION:  Transvaginal pelvic ultrasound.     COMPARISON:   None     FINDINGS:  Transvaginal scanning was performed.     The uterus measures 3.08 cm x 6.90 cm x 4.22 cm.  The uterine myometrium is within normal limits.  The endometrial echo complex measures 0.53 cm. IUD is noted centrally in the endometrial cavity.     Low resistive waveforms are confirmed within the ovaries.  The right ovary measures 3.17 cm x 1.83 cm x 2.50 cm.  Small residual right ovarian cyst measures 2.4 x 2.0 x 1.1 cm.  The left ovary measures 2.95 cm x 1.24 cm x 2.11 cm.     Tubular structure in right lower quadrant likely represents normal appendix measuring 5 mm in diameter.     Small amount of free fluid is identified.     IMPRESSION:     1.  Small amount of free fluid identified.     2.  Small right ovarian cyst is noted which is likely physiologic.     3.  IUD is noted in the  endometrial cavity.   Assessment/Plan:     1.  Right-sided pelvic pain-likely due to ruptured ovarian cyst.  Symptoms are improving and patient is clinically stable.  Patient reassured.  She will continue ibuprofen and heat as needed      2. Findings and ultrasound discussed. Cyst of right ovary.  No follow-up needed          2. IUD (intrauterine device) in place  IUD is in place.  Patient is happy with IUD    Precautions and plan of care reviewed    Patient to follow-up for annual gynecologic exam and as needed for any gynecologic concerns.

## 2020-12-14 ENCOUNTER — HOSPITAL ENCOUNTER (OUTPATIENT)
Dept: LAB | Facility: MEDICAL CENTER | Age: 25
End: 2020-12-14
Attending: FAMILY MEDICINE
Payer: COMMERCIAL

## 2020-12-14 LAB
COVID ORDER STATUS COVID19: NORMAL
SARS-COV-2 RNA RESP QL NAA+PROBE: NOTDETECTED
SPECIMEN SOURCE: NORMAL

## 2020-12-14 PROCEDURE — U0003 INFECTIOUS AGENT DETECTION BY NUCLEIC ACID (DNA OR RNA); SEVERE ACUTE RESPIRATORY SYNDROME CORONAVIRUS 2 (SARS-COV-2) (CORONAVIRUS DISEASE [COVID-19]), AMPLIFIED PROBE TECHNIQUE, MAKING USE OF HIGH THROUGHPUT TECHNOLOGIES AS DESCRIBED BY CMS-2020-01-R: HCPCS

## 2020-12-14 PROCEDURE — C9803 HOPD COVID-19 SPEC COLLECT: HCPCS

## 2021-01-19 ENCOUNTER — HOSPITAL ENCOUNTER (OUTPATIENT)
Facility: MEDICAL CENTER | Age: 26
End: 2021-01-19
Attending: OBSTETRICS & GYNECOLOGY
Payer: COMMERCIAL

## 2021-01-19 ENCOUNTER — GYNECOLOGY VISIT (OUTPATIENT)
Dept: OBGYN | Facility: CLINIC | Age: 26
End: 2021-01-19
Payer: COMMERCIAL

## 2021-01-19 VITALS — BODY MASS INDEX: 27.06 KG/M2 | DIASTOLIC BLOOD PRESSURE: 67 MMHG | SYSTOLIC BLOOD PRESSURE: 110 MMHG | WEIGHT: 134 LBS

## 2021-01-19 DIAGNOSIS — Z01.419 WELL WOMAN EXAM WITH ROUTINE GYNECOLOGICAL EXAM: ICD-10-CM

## 2021-01-19 DIAGNOSIS — Z11.3 SCREENING FOR VENEREAL DISEASE: ICD-10-CM

## 2021-01-19 PROCEDURE — 87491 CHLMYD TRACH DNA AMP PROBE: CPT

## 2021-01-19 PROCEDURE — 87591 N.GONORRHOEAE DNA AMP PROB: CPT

## 2021-01-19 PROCEDURE — 99395 PREV VISIT EST AGE 18-39: CPT | Performed by: OBSTETRICS & GYNECOLOGY

## 2021-01-19 NOTE — PROGRESS NOTES
Subjective:      Sandra Rosue is a 25 y.o. female who presents with Gynecologic Exam (annual)        CC: annual exam    HPI: 25-year-old 0, last menstrual period 12/26/2020, using Dolly for contraception, presents for annual exam.  She states her bleeding is regular with a Dolly in place.  It has been in since March 2019.  Family history is remarkable for a maternal aunt with breast cancer diagnosed in her 40s.  She has no history of abnormal Pap smears or STDs.    History reviewed. No pertinent past medical history.    History reviewed. No pertinent surgical history.      Current Outpatient Medications:   •  fluconazole (DIFLUCAN) 150 MG tablet, Take 1 Tab by mouth every day. (Patient not taking: Reported on 2/4/2019), Disp: 1 Tab, Rfl: 0  •  ibuprofen (MOTRIN) 800 MG Tab, Take 800 mg by mouth every 8 hours as needed., Disp: , Rfl:     Latex    Family History   Problem Relation Age of Onset   • Thyroid Mother    • No Known Problems Father    • Breast Cancer Maternal Aunt 39   • Hypertension Maternal Grandmother    • Heart Disease Paternal Grandmother    • Diabetes Neg Hx    • Depression Neg Hx    • Heart Attack Neg Hx    • Stroke Neg Hx        Social History     Socioeconomic History   • Marital status: Single     Spouse name: Not on file   • Number of children: Not on file   • Years of education: Not on file   • Highest education level: Not on file   Occupational History   • Not on file   Social Needs   • Financial resource strain: Not on file   • Food insecurity     Worry: Not on file     Inability: Not on file   • Transportation needs     Medical: Not on file     Non-medical: Not on file   Tobacco Use   • Smoking status: Never Smoker   • Smokeless tobacco: Never Used   Substance and Sexual Activity   • Alcohol use: Yes   • Drug use: No   • Sexual activity: Yes     Partners: Male     Birth control/protection: Condom   Lifestyle   • Physical activity     Days per week: Not on file     Minutes per session:  Not on file   • Stress: Not on file   Relationships   • Social connections     Talks on phone: Not on file     Gets together: Not on file     Attends Zoroastrianism service: Not on file     Active member of club or organization: Not on file     Attends meetings of clubs or organizations: Not on file     Relationship status: Not on file   • Intimate partner violence     Fear of current or ex partner: Not on file     Emotionally abused: Not on file     Physically abused: Not on file     Forced sexual activity: Not on file   Other Topics Concern   • Not on file   Social History Narrative    ** Merged History Encounter **         ** Data from: 16 Enc Dept: [facility]         ** Data from: 3/29/17 Enc [facility]    Patient is single, lives with her family. In school full time, not working        OB History    Para Term  AB Living   0 0 0 0 0 0   SAB TAB Ectopic Molar Multiple Live Births   0 0 0 0 0 0       ROS REVIEW OF SYSTEMS:    Pertinent positives and negatives mentioned in HPI.    All other systems reviewed and are negative or noncontributory.       Objective:     /67   Wt 60.8 kg (134 lb)   LMP 2020   BMI 27.06 kg/m²      Physical Exam      GENERAL: Alert, in no apparent distress  PSYCHIATRIC: Appropriate affect, intact insight and judgement.  NECK:  Nontender, no masses.  No Thyromegaly or nodules. No lymphadenopathy.  RESPIRATORY: Normal respiratory effort.  Lungs clear to auscultation.   CARDIOVASCULAR: RRR, no murmur, gallop, or rub.  ABDOMEN: Soft, nontender, nondistended.  No palpable masses.  No rebound or guarding.  No inguinal lymphadenopathy.  No hepatosplenomegaly.  No hernias.  BACK: No CVA tenderness  EXTREMITIES: No edema  SKIN: No rash    BREAST: No masses or tenderness.  No skin changes.  No nipple inversion or discharge. No axillary lymphadenopathy.      GENITOURINARY:  Normal external genitalia, no lesions.  Normal urethral meatus, no masses or  tenderness.  Normal bladder without fullness or masses.  Vagina well estrogenized, no vaginal discharge or lesions.  Cervix without lesions or discharge, nontender.  IUD strings visualized.   Uterus normal size, shape, and contour, nontender.  Adnexa nontender, no masses.  Normal anus and perineum.    Rectal Exam - not indicated.       Assessment/Plan:        1. Well woman exam with routine gynecological exam  Reviewed monthly self breast exams and need for yearly screening mammograms starting at age 40.  Discussed calcium intake, Vitamin D,  and weight bearing exercise for bone health.  Pap smear from 2019 was within normal limits.    2. Screening for venereal disease  - Chlamydia/GC PCR Urine Or Swab; Future      Follow-up 1 year or as needed.  Patient reminded the Dolly IUD will  in 2022.

## 2021-01-19 NOTE — NON-PROVIDER
Pt here for annual exam  Pt states  No complamts  Pharmacy verified   Good # [phone]   Pap: 2019 normal  Lmp:12/26/20  Bc:IUD

## 2021-01-20 DIAGNOSIS — Z11.3 SCREENING FOR VENEREAL DISEASE: ICD-10-CM

## 2021-04-01 ENCOUNTER — TELEPHONE (OUTPATIENT)
Dept: OBGYN | Facility: CLINIC | Age: 26
End: 2021-04-01

## 2021-04-01 ENCOUNTER — NURSE TRIAGE (OUTPATIENT)
Dept: HEALTH INFORMATION MANAGEMENT | Facility: OTHER | Age: 26
End: 2021-04-01

## 2021-04-01 ENCOUNTER — GYNECOLOGY VISIT (OUTPATIENT)
Dept: OBGYN | Facility: CLINIC | Age: 26
End: 2021-04-01
Payer: COMMERCIAL

## 2021-04-01 VITALS
BODY MASS INDEX: 27.42 KG/M2 | HEIGHT: 59 IN | SYSTOLIC BLOOD PRESSURE: 138 MMHG | WEIGHT: 136 LBS | DIASTOLIC BLOOD PRESSURE: 76 MMHG

## 2021-04-01 DIAGNOSIS — R10.2 PELVIC PAIN: ICD-10-CM

## 2021-04-01 DIAGNOSIS — N80.9 ENDOMETRIOSIS: ICD-10-CM

## 2021-04-01 PROCEDURE — 99214 OFFICE O/P EST MOD 30 MIN: CPT | Performed by: OBSTETRICS & GYNECOLOGY

## 2021-04-01 NOTE — PROGRESS NOTES
Patient here for GYN exam.  C/o pt c/o Lower Left pelvic pain with bleeding x3 days   LMP=  03/26/2021  BCM: Dolly IUD  Last pap: 02/14/2019 Negative  Phone number: [phone]  Pharmacy verified

## 2021-04-01 NOTE — TELEPHONE ENCOUNTER
"  Hx. Of \"cyst bursting\"; verified by US  Transferred to [facility] for same day appointment, or if unable to attend go to ED   Reason for Disposition  • Patient wants to be seen    Additional Information  • Negative: Pregnant > 20 weeks (5 months or more)  • Negative: Pregnant < 20 weeks (less than 5 months)  • Negative: Postpartum (from 0 to 6 weeks after delivery)  • Negative: Vaginal discharge is the main symptom and bleeding is slight  • Negative: SEVERE vaginal bleeding (e.g., continuous red blood from vagina, or large blood clots) and very weak (can't stand)  • Negative: Passed out (i.e., fainted, collapsed and was not responding)  • Negative: Difficult to awaken or acting confused (e.g., disoriented, slurred speech)  • Negative: Shock suspected (e.g., cold/pale/clammy skin, too weak to stand, low BP, rapid pulse)  • Negative: Sounds like a life-threatening emergency to the triager  • Negative: SEVERE abdominal pain (e.g., excruciating)  • Negative: SEVERE dizziness (e.g., unable to stand, requires support to walk, feels like passing out now)    Answer Assessment - Initial Assessment Questions  1. AMOUNT: \"Describe the bleeding that you are having.\"     - SPOTTING: spotting, or pinkish / brownish mucous discharge; does not fill panti-liner or pad     - MILD:  less than 1 pad / hour; less than patient's usual menstrual bleeding    - MODERATE: 1-2 pads / hour; 1 menstrual cup every 6 hours; small-medium blood clots (e.g., pea, grape, small coin)    - SEVERE: soaking 2 or more pads/hour for 2 or more hours; 1 menstrual cup every 2 hours; bleeding not contained by pads or continuous red blood from vagina; large blood clots (e.g., golf ball, large coin)       Mild bleeding  2. ONSET: \"When did the bleeding begin?\" \"Is it continuing now?\"      3 times this month, 7 days first time \"normal period\" approx. 3/6, 3/19-3/22 bled again BRB, 3/31 started again old blood  3. MENSTRUAL PERIOD: \"When was the last normal " "menstrual period?\" \"How is this different than your period?\"      3/6/21  4. REGULARITY: \"How regular are your periods?\"      Regular, has IUD  5. ABDOMINAL PAIN: \"Do you have any pain?\" \"How bad is the pain?\"  (e.g., Scale 1-10; mild, moderate, or severe)    - MILD (1-3): doesn't interfere with normal activities, abdomen soft and not tender to touch     - MODERATE (4-7): interferes with normal activities or awakens from sleep, tender to touch     - SEVERE (8-10): excruciating pain, doubled over, unable to do any normal activities       severe  6. PREGNANCY: \"Could you be pregnant?\" \"Are you sexually active?\" \"Did you recently give birth?\"      Has not checked, is sexually active  7. BREASTFEEDING: \"Are you breastfeeding?\"      no  8. HORMONES: \"Are you taking any hormone medications, prescription or OTC?\" (e.g., birth control pills, estrogen)      IUD-Dolly  9. BLOOD THINNERS: \"Do you take any blood thinners?\" (e.g., Coumadin/warfarin, Pradaxa/dabigatran, aspirin)      no  10. CAUSE: \"What do you think is causing the bleeding?\" (e.g., recent gyn surgery, recent gyn procedure; known bleeding disorder, cervical cancer, polycystic ovarian disease, fibroids)          Cyst bursting  11. HEMODYNAMIC STATUS: \"Are you weak or feeling lightheaded?\" If so, ask: \"Can you stand and walk normally?\"         Yes weak, lightheaded and nauseated. Painful to walk  12. OTHER SYMPTOMS: \"What other symptoms are you having with the bleeding?\" (e.g., passed tissue, vaginal discharge, fever, menstrual-type cramps)        Sharp stabbing pain comes and goes; burning with urination    Protocols used: VAGINAL BLEEDING - [id number]      "

## 2021-04-01 NOTE — PROGRESS NOTES
GYN Visit  CC: pelvic pain     Sandra Rouse is a 25 y.o.  who presents today for sharp left sided pelvic pain.  This awoke from sleep two nights ago and the severe pain lasted about 10 minutes and since then it has been crampy.  She is taking tylenol which helps some.  She started bleeding today.  Has Dolly.  Has had pain like this in the past (last year) and she was told she had a cyst.      She has been told in the past that she may have endometriosis because of her painful menses.  She had very heavy and prolonged bleeding prior to the contraception.  She was on OCPs prior to having the Dolly for about a year now.         OB History:    OB History    Para Term  AB Living   0 0 0 0 0     SAB TAB Ectopic Molar Multiple Live Births   0 0 0             Review of Systems:   Pertinent positives documented in HPI and all other systems reviewed & are negative.    All PMH, PSH, allergies, social history and FH reviewed and updated today:  Past Medical History:  History reviewed. No pertinent past medical history.    Past Surgical History:  History reviewed. No pertinent surgical history.    Medications:   Current Outpatient Medications Ordered in Epic   Medication Sig Dispense Refill   • fluconazole (DIFLUCAN) 150 MG tablet Take 1 Tab by mouth every day. (Patient not taking: Reported on 2019) 1 Tab 0   • ibuprofen (MOTRIN) 800 MG Tab Take 800 mg by mouth every 8 hours as needed.       No current Epic-ordered facility-administered medications on file.       Allergies: Latex    Social History:  Social History     Socioeconomic History   • Marital status: Single     Spouse name: Not on file   • Number of children: Not on file   • Years of education: Not on file   • Highest education level: Not on file   Occupational History   • Not on file   Tobacco Use   • Smoking status: Never Smoker   • Smokeless tobacco: Never Used   Substance and Sexual Activity   • Alcohol use: Yes   • Drug use: No   •  "Sexual activity: Yes     Partners: Male     Birth control/protection: Condom   Other Topics Concern   • Not on file   Social History Narrative    ** Merged History Encounter **         ** Data from: 5/17/16 Enc Dept: [facility]         ** Data from: 3/29/17 Enc [facility]    Patient is single, lives with her family. In school full time, not working      Social Determinants of Health     Financial Resource Strain:    • Difficulty of Paying Living Expenses:    Food Insecurity:    • Worried About Running Out of Food in the Last Year:    • Ran Out of Food in the Last Year:    Transportation Needs:    • Lack of Transportation (Medical):    • Lack of Transportation (Non-Medical):    Physical Activity:    • Days of Exercise per Week:    • Minutes of Exercise per Session:    Stress:    • Feeling of Stress :    Social Connections:    • Frequency of Communication with Friends and Family:    • Frequency of Social Gatherings with Friends and Family:    • Attends Worship Services:    • Active Member of Clubs or Organizations:    • Attends Club or Organization Meetings:    • Marital Status:    Intimate Partner Violence:    • Fear of Current or Ex-Partner:    • Emotionally Abused:    • Physically Abused:    • Sexually Abused:        Family History:  Family History   Problem Relation Age of Onset   • Thyroid Mother    • No Known Problems Father    • Breast Cancer Maternal Aunt 39   • Hypertension Maternal Grandmother    • Heart Disease Paternal Grandmother    • Diabetes Neg Hx    • Depression Neg Hx    • Heart Attack Neg Hx    • Stroke Neg Hx            Objective:   Vitals:  /76 (BP Location: Left arm, Patient Position: Sitting, BP Cuff Size: Adult)   Ht 1.499 m (4' 11\")   Wt 61.7 kg (136 lb)   Body mass index is 27.47 kg/m². (Goal BM I>18 <25)    Physical Exam:   Nursing note and vitals reviewed.  GENERAL: No acute distress  HENT: Atraumatic, normocephalic  EYES: Extraocular movements intact, " pupils equal and reactive to light  NECK: Supple, Full ROM  CHEST: No deformities, Equal chest expansion  RESP: Unlabored, no stridor or audible wheeze  ABD: Soft, Nontender, Non-Distended  Extremities: No Clubbing, Cyanosis, or Edema  Skin: Warm/dry, without rashes  Neuro: A/O x 4, CN 2-12 Grossly intact, Motor/sensory grossly intact  Psych: Normal mood, behavior, and affect    Genitourinary:  Normal appearing external female genitalia without any rashes, lesions, labial fusion or tenderness.  Normal appearing urethral meatus with no lesions or prolapse, normal urethra with no masses/tenderness.  Vagina is pink moist and well rugated.  Dark bloody discharge present within vaginal vault.  Cervix well visualized without masses or lesions.  IUD strings at os.  Normal appearing anus with no visible hemorrhoids. On bimanual exam, bladder is non tender, there is no cervical motion tenderness, uterus is anteverted, slightly enlarged, bilateral adnexal fullness and tenderness, right>left.      Assessment/Plan:   Sandra Rouse is a 25 y.o.  female who presents for:    1. Endometriosis  US-PELVIC COMPLETE (TRANSABDOMINAL/TRANSVAGINAL) (COMBO)   2. Pelvic pain  US-PELVIC COMPLETE (TRANSABDOMINAL/TRANSVAGINAL) (COMBO)     #Pelvic pain, suspect endometriosis with endometrioma or recent cyst rupture.  Adnexal fullness is appreciated on exam today.  Patient has history of painful menses which was managed for some time with the Dolly IUD however seems to not be managed quite as well recently.  Given HPI likely recently had a cyst or endometrioma rupture.  We will perform pelvic ultrasound to further evaluate.  After evaluation with ultrasound patient will return for us to discuss potential next steps given an adequate management at this time.  #Follow up.  RTC in 3wks following US or sooner if concerns or questions arise.    This encounter took 25 minutes of which > 50% of time was spent on face-to-face counseling and  coordination of care regarding the above.    Please note that this note was created using voice recognition software. I have made every reasonable attempt to correct obvious errors, but expect that there are errors of grammar and possibly of content that I did not discover before finalizing note.

## 2021-04-01 NOTE — TELEPHONE ENCOUNTER
Pt transferred to us from nurse [facility], c/o left sided px. I asked the pt if she was currently bleeding, pt states yes. I asked the pt if she was bleeding enough to fill a pad within one hour, pt states no. Offered the pt a 2pm appt for today, pt starts crying, states that she is in a lot of pain. Pt instructed that with the amount of pain she is in, she needed to go to the ER. Pt states that she wants to schedule for the 2pm appt, and will go to the ER if the pain doesn't go away or gets worse. No further questions.

## 2021-05-06 ENCOUNTER — GYNECOLOGY VISIT (OUTPATIENT)
Dept: OBGYN | Facility: CLINIC | Age: 26
End: 2021-05-06
Payer: COMMERCIAL

## 2021-05-06 VITALS
WEIGHT: 137 LBS | SYSTOLIC BLOOD PRESSURE: 119 MMHG | DIASTOLIC BLOOD PRESSURE: 74 MMHG | HEIGHT: 59 IN | BODY MASS INDEX: 27.62 KG/M2

## 2021-05-06 DIAGNOSIS — N93.9 ABNORMAL UTERINE BLEEDING (AUB): ICD-10-CM

## 2021-05-06 DIAGNOSIS — N80.9 ENDOMETRIOSIS: ICD-10-CM

## 2021-05-06 PROCEDURE — 99213 OFFICE O/P EST LOW 20 MIN: CPT | Performed by: OBSTETRICS & GYNECOLOGY

## 2021-05-06 NOTE — PROGRESS NOTES
GYN Visit  CC: fu pain/bleeding     Sandra Rouse is a 25 y.o.  who presents today for follow-up of pelvic pain and irregular spotting.  She has had a Dolly IUD in place for the past 2 years.  She reports that it first discussed the controlled her pelvic pain and bleeding but in the last few months the pain and bleeding have increased.  She reports that in the last month she only had 7 days without spotting.  She is aware that she may have endometriosis as she has always had very painful menses.  She has tried OCPs in the past and did not respond to those well.  They failed to control her bleeding and pain.  She is interested in addressing other options today she had a cyst on previous ultrasound and therefore ultrasound was repeated with results as follows:.       Ultrasound done at [facility]  Uterus 6.3 x 4.5 x 2.5.  Endometrium is normal in echogenicity and thickness measuring 2.6 mm.  IUD is centrally positioned within the uterus.  Right ovary is normal measuring 2.1 x 2.1 x 1.1 cm.  Left ovary is normal measuring 1.4 x 1.2 x 1.1.  No adnexal masses.    OB History:    OB History    Para Term  AB Living   0 0 0 0 0     SAB TAB Ectopic Molar Multiple Live Births   0 0 0             Review of Systems:   Pertinent positives documented in HPI and all other systems reviewed & are negative.    All PMH, PSH, allergies, social history and FH reviewed and updated today:  Past Medical History:  History reviewed. No pertinent past medical history.    Past Surgical History:  History reviewed. No pertinent surgical history.    Medications:   No current [facility]-ordered outpatient medications on file.     No current [facility]-ordered facility-administered medications on file.       Allergies: Latex    Social History:  Social History     Socioeconomic History   • Marital status: Single     Spouse name: Not on file   • Number of children: Not on file   • Years of education: Not on file   • Highest  "education level: Not on file   Occupational History   • Not on file   Tobacco Use   • Smoking status: Never Smoker   • Smokeless tobacco: Never Used   Substance and Sexual Activity   • Alcohol use: Yes   • Drug use: No   • Sexual activity: Yes     Partners: Male     Birth control/protection: Condom   Other Topics Concern   • Not on file   Social History Narrative    ** Merged History Encounter **         ** Data from: 5/17/16 Enc Dept: [facility]         ** [facility]    Patient is single, lives with her family. In school full time, not working      Social Determinants of Health     Financial Resource Strain:    • Difficulty of Paying Living Expenses:    Food Insecurity:    • Worried About Running Out of Food in the Last Year:    • Ran Out of Food in the Last Year:    Transportation Needs:    • Lack of Transportation (Medical):    • Lack of Transportation (Non-Medical):    Physical Activity:    • Days of Exercise per Week:    • Minutes of Exercise per Session:    Stress:    • Feeling of Stress :    Social Connections:    • Frequency of Communication with Friends and Family:    • Frequency of Social Gatherings with Friends and Family:    • Attends Hinduism Services:    • Active Member of Clubs or Organizations:    • Attends Club or Organization Meetings:    • Marital Status:    Intimate Partner Violence:    • Fear of Current or Ex-Partner:    • Emotionally Abused:    • Physically Abused:    • Sexually Abused:        Family History:  Family History   Problem Relation Age of Onset   • Thyroid Mother    • No Known Problems Father    • Breast Cancer Maternal Aunt 39   • Hypertension Maternal Grandmother    • Heart Disease Paternal Grandmother    • Diabetes Neg Hx    • Depression Neg Hx    • Heart Attack Neg Hx    • Stroke Neg Hx            Objective:   Vitals:  /74 (BP Location: Left arm, Patient Position: Sitting, BP Cuff Size: Adult)   Ht 1.499 m (4' 11\")   Wt " 62.1 kg (137 lb)   Body mass index is 27.67 kg/m². (Goal BM I>18 <25)    Physical Exam:   Nursing note and vitals reviewed.  GENERAL: No acute distress  HENT: Atraumatic, normocephalic  EYES: Extraocular movements intact, pupils equal and reactive to light  NECK: Supple, Full ROM  CHEST: No deformities, Equal chest expansion  RESP: Unlabored, no stridor or audible wheeze  ABD: Soft, Nontender, Non-Distended  Extremities: No Clubbing, Cyanosis, or Edema  Skin: Warm/dry, without rashes  Neuro: A/O x 4, CN 2-12 Grossly intact, Motor/sensory grossly intact  Psych: Normal mood, behavior, and affect    Assessment/Plan:   Sandra Rouse is a 25 y.o.  female who presents for:    1. Endometriosis     2. Abnormal uterine bleeding (AUB)       Discussed with patient the low hormone content of Dolly and other options from help in management of endometriosis.  Given this and her breakthrough bleeding she is interested in trying a higher hormonal IUD.  She will plan to return to clinic for Dolly removal and Mirena insertion  #Follow up.  RTC for IUD removal/reinsertion or sooner if concerns or questions arise.    This encounter took 15 minutes of which > 50% of time was spent on face-to-face counseling and coordination of care regarding the above.    Please note that this note was created using voice recognition software. I have made every reasonable attempt to correct obvious errors, but expect that there are errors of grammar and possibly of content that I did not discover before finalizing note.

## 2021-05-06 NOTE — PROGRESS NOTES
Patient here for a GYN follow up.   Last seen on 04/01/2021  C/o u/s results   pharmacy verified.  Patient phone #: [phone]

## 2021-06-10 ENCOUNTER — GYNECOLOGY VISIT (OUTPATIENT)
Dept: OBGYN | Facility: CLINIC | Age: 26
End: 2021-06-10
Payer: COMMERCIAL

## 2021-06-10 VITALS
WEIGHT: 140 LBS | SYSTOLIC BLOOD PRESSURE: 134 MMHG | BODY MASS INDEX: 27.48 KG/M2 | DIASTOLIC BLOOD PRESSURE: 80 MMHG | HEIGHT: 60 IN

## 2021-06-10 DIAGNOSIS — Z30.430 ENCOUNTER FOR INSERTION OF MIRENA IUD: ICD-10-CM

## 2021-06-10 DIAGNOSIS — Z30.432 ENCOUNTER FOR IUD REMOVAL: ICD-10-CM

## 2021-06-10 PROCEDURE — 58300 INSERT INTRAUTERINE DEVICE: CPT | Performed by: OBSTETRICS & GYNECOLOGY

## 2021-06-10 PROCEDURE — 58301 REMOVE INTRAUTERINE DEVICE: CPT | Mod: 59 | Performed by: OBSTETRICS & GYNECOLOGY

## 2021-06-10 NOTE — PROCEDURES
IUD Removal    Date/Time: 6/10/2021 2:24 PM  Performed by: Nedra Villarreal D.O.  Authorized by: Nedra Villarreal D.O.     Consent:     Consent obtained:  Written    Consent given by:  Patient    Procedure risks and benefits discussed: yes      Patient questions answered: yes      Patient agrees, verbalizes understanding, and wants to proceed: yes      Educational handouts given: yes      Instructions and paperwork completed: yes    Pre-procedure details:     Reason for removal:  IUD      IUD placed at this facility: no    Procedure:     Pelvic exam performed: yes      Speculum placed: yes      IUD strings visualized in external os: yes      Removal mechanism:  Ring forceps    IUD removed intact: yes      IUD type removed:  Mirena    Removal complications: no    Post-procedure:     New birth control prescribed: yes      Counseling regarding contraception given: yes    IUD Insertion    Date/Time: 6/10/2021 2:25 PM  Performed by: Nedra Villarreal D.O.  Authorized by: Nedra Villarreal D.O.     Consent:     Consent obtained:  Written    Consent given by:  Patient    Procedure risks and benefits discussed: yes      Patient questions answered: yes      Patient agrees, verbalizes understanding, and wants to proceed: yes      Educational handouts given: yes      Instructions and paperwork completed: yes    Pre-procedure details:     Negative GC/chlamydia test: no      Negative urine pregnancy test: yes      Negative serum pregnancy test: no    Procedure:     Pelvic exam performed: yes      Sterile speculum placed in vagina: yes      Cervix visualized: yes      Cervix cleaned and prepped in sterile fashion: yes      Tenaculum applied to cervix: yes      Dilation needed: yes      Uterus sounded: yes      Uterus sound depth (cm):  7    IUD inserted with no complications: yes      IUD type:  Mirena    Strings trimmed: yes    Post-procedure:     Patient tolerated procedure well: yes       Patient will follow up after next period: yes    Comments:      LOT [id number]  Exp Sept 2023

## 2021-06-10 NOTE — PROGRESS NOTES
Patient here for GYN exam.  C/o Mirena removal and re insertion  LMP=  05/20/2021  BCM: Mirena   Last pap: 02/05/2019 WNL  Phone number:  Pharmacy verified

## 2021-07-14 ENCOUNTER — HOSPITAL ENCOUNTER (OUTPATIENT)
Facility: MEDICAL CENTER | Age: 26
End: 2021-07-14
Attending: PHYSICIAN ASSISTANT
Payer: COMMERCIAL

## 2021-07-14 ENCOUNTER — OFFICE VISIT (OUTPATIENT)
Dept: URGENT CARE | Facility: PHYSICIAN GROUP | Age: 26
End: 2021-07-14
Payer: COMMERCIAL

## 2021-07-14 VITALS
TEMPERATURE: 97 F | HEART RATE: 63 BPM | OXYGEN SATURATION: 100 % | BODY MASS INDEX: 27.21 KG/M2 | DIASTOLIC BLOOD PRESSURE: 72 MMHG | WEIGHT: 135 LBS | HEIGHT: 59 IN | SYSTOLIC BLOOD PRESSURE: 118 MMHG | RESPIRATION RATE: 16 BRPM

## 2021-07-14 DIAGNOSIS — B35.1 ONYCHOMYCOSIS OF TOENAIL: ICD-10-CM

## 2021-07-14 DIAGNOSIS — N30.00 ACUTE CYSTITIS WITHOUT HEMATURIA: ICD-10-CM

## 2021-07-14 LAB
APPEARANCE UR: NORMAL
BILIRUB UR STRIP-MCNC: NEGATIVE MG/DL
COLOR UR AUTO: NORMAL
GLUCOSE UR STRIP.AUTO-MCNC: NEGATIVE MG/DL
INT CON NEG: NORMAL
INT CON POS: POSITIVE
KETONES UR STRIP.AUTO-MCNC: NEGATIVE MG/DL
LEUKOCYTE ESTERASE UR QL STRIP.AUTO: NORMAL
NITRITE UR QL STRIP.AUTO: NEGATIVE
PH UR STRIP.AUTO: 7 [PH] (ref 5–8)
POC URINE PREGNANCY TEST: NEGATIVE
PROT UR QL STRIP: NEGATIVE MG/DL
RBC UR QL AUTO: NEGATIVE
SP GR UR STRIP.AUTO: 1.01
UROBILINOGEN UR STRIP-MCNC: 0.2 MG/DL

## 2021-07-14 PROCEDURE — 81025 URINE PREGNANCY TEST: CPT | Performed by: PHYSICIAN ASSISTANT

## 2021-07-14 PROCEDURE — 87086 URINE CULTURE/COLONY COUNT: CPT

## 2021-07-14 PROCEDURE — 99214 OFFICE O/P EST MOD 30 MIN: CPT | Performed by: PHYSICIAN ASSISTANT

## 2021-07-14 PROCEDURE — 81002 URINALYSIS NONAUTO W/O SCOPE: CPT | Performed by: PHYSICIAN ASSISTANT

## 2021-07-14 RX ORDER — NITROFURANTOIN 25; 75 MG/1; MG/1
100 CAPSULE ORAL EVERY 12 HOURS
Qty: 10 CAPSULE | Refills: 0 | Status: SHIPPED | OUTPATIENT
Start: 2021-07-14 | End: 2021-07-19

## 2021-07-14 RX ORDER — PHENAZOPYRIDINE HYDROCHLORIDE 200 MG/1
200 TABLET, FILM COATED ORAL 3 TIMES DAILY
Qty: 6 TABLET | Refills: 0 | Status: SHIPPED | OUTPATIENT
Start: 2021-07-14 | End: 2021-07-16

## 2021-07-14 ASSESSMENT — ENCOUNTER SYMPTOMS
FLANK PAIN: 0
COUGH: 0
HEADACHES: 0
ABDOMINAL PAIN: 0
PALPITATIONS: 0
NAUSEA: 0
VOMITING: 0
DIARRHEA: 0
CHILLS: 0
MYALGIAS: 0
DIZZINESS: 0
FEVER: 0

## 2021-07-14 NOTE — PROGRESS NOTES
Subjective:   Sandra Rouse is a 25 y.o. female who presents for Dysuria (cramping, cloudy [id number])      HPI:  This is a very pleasant 25-year-old female presenting to the clinic with dysuria, bloody urine and urinary frequency x2 days.  Patient states it feels like previous UTIs.  She denies any hematuria.  Denies any nausea, vomiting, fevers, chills or flank pain.  No history of nephrolithiasis.  She has been increasing her fluid intake with no relief.  Patient would also like to discuss her toenails.  She has had discoloration and crusting of the bilateral great toes.  She is concerned for potential infection.  States this has been ongoing for the last couple months.  She has not tried any OTC treatment at this time.      Review of Systems   Constitutional: Negative for chills, fever and malaise/fatigue.   Respiratory: Negative for cough.    Cardiovascular: Negative for chest pain and palpitations.   Gastrointestinal: Negative for abdominal pain, diarrhea, nausea and vomiting.   Genitourinary: Positive for dysuria and frequency. Negative for flank pain, hematuria and urgency.   Musculoskeletal: Negative for joint pain and myalgias.   Skin: Negative for rash.        Crusting and enlarging great toenails bilaterally   Neurological: Negative for dizziness and headaches.       Medications:    • nitrofurantoin Caps  • phenazopyridine Tabs    Allergies: Latex    Problem List: Sandra Rouse does not have any pertinent problems on file.    Surgical History:  No past surgical history on file.    Past Social Hx: Sandra Rouse  reports that she has never smoked. She has never used smokeless tobacco. She reports current alcohol use. She reports that she does not use drugs.     Past Family Hx:  Sandra Rouse family history includes Breast Cancer (age of onset: 39) in her maternal aunt; Heart Disease in her paternal grandmother; Hypertension in her maternal grandmother; No Known Problems in her  "father; Thyroid in her mother.     Problem list, medications, and allergies reviewed by myself today in Epic.     Objective:     /72   Pulse 63   Temp 36.1 °C (97 °F) (Temporal)   Resp 16   Ht 1.499 m (4' 11\")   Wt 61.2 kg (135 lb)   SpO2 100%   BMI 27.27 kg/m²     Physical Exam  Constitutional:       General: She is not in acute distress.     Appearance: Normal appearance. She is not ill-appearing, toxic-appearing or diaphoretic.   HENT:      Head: Normocephalic and atraumatic.      Mouth/Throat:      Mouth: Mucous membranes are moist.      Pharynx: No oropharyngeal exudate or posterior oropharyngeal erythema.   Eyes:      Conjunctiva/sclera: Conjunctivae normal.   Cardiovascular:      Rate and Rhythm: Normal rate and regular rhythm.      Pulses: Normal pulses.      Heart sounds: Normal heart sounds.   Pulmonary:      Effort: Pulmonary effort is normal.      Breath sounds: Normal breath sounds. No wheezing.   Abdominal:      General: Bowel sounds are normal. There is no distension.      Palpations: Abdomen is soft. There is no mass.      Tenderness: There is no abdominal tenderness. There is no right CVA tenderness, left CVA tenderness, guarding or rebound.      Hernia: No hernia is present.   Musculoskeletal:         General: Normal range of motion.      Cervical back: Normal range of motion. No muscular tenderness.   Lymphadenopathy:      Cervical: No cervical adenopathy.   Skin:     General: Skin is warm and dry.      Capillary Refill: Capillary refill takes less than 2 seconds.      Comments: Slightly enlarged great toenails bilaterally.  Currently the patient has nail polish on in clinic so hard to fully examine. Prior pictures were examined and findings are consistent with onychomycosis.  No soft tissue swelling or erythema.   Neurological:      General: No focal deficit present.      Mental Status: She is alert and oriented to person, place, and time. Mental status is at baseline.   Psychiatric: "         Mood and Affect: Mood normal.         Thought Content: Thought content normal.       Lab Results/POC Test Results   Results for orders placed or performed in visit on 07/14/21   POCT Urinalysis   Result Value Ref Range    POC Color light yellow Negative    POC Appearance cloudy Negative    POC Leukocyte Esterase small Negative    POC Nitrites negative Negative    POC Urobiligen 0.2 Negative (0.2) mg/dL    POC Protein negative Negative mg/dL    POC Urine PH 7.0 5.0 - 8.0    POC Blood negative Negative    POC Specific Gravity 1.010 <1.005 - >1.030    POC Ketones negative Negative mg/dL    POC Bilirubin negative Negative mg/dL    POC Glucose negative Negative mg/dL   POCT PREGNANCY   Result Value Ref Range    POC Urine Pregnancy Test negative Negative    Internal Control Positive Positive     Internal Control Negative None Detected                Assessment/Plan:     Comments/MDM:     Pt educated on preventative measures for avoiding future UTIs  Advised to increase fluid intake  OTC Pyridium (Azo) for symptomatic relief, advised that it will turn urine orange in color  Pending urine culture  ER precautions given regarding pyelonephritis including fevers greater than 101 and, vomiting and dehydration, increased back pain.  Encourage follow-up with PCP for onychomycosis management.  Likely will require lab monitoring throughout treatment.     Diagnosis and associated orders:     1. Acute cystitis without hematuria  POCT Urinalysis    POCT PREGNANCY    Urine Culture    nitrofurantoin (MACROBID) 100 MG Cap    phenazopyridine (PYRIDIUM) 200 MG Tab   2. Onychomycosis of toenail                Differential diagnosis, natural history, supportive care, and indications for immediate follow-up discussed.    Advised the patient to follow-up with the primary care physician for recheck, reevaluation, and consideration of further management.    Please note that this dictation was created using voice recognition software. I  have made reasonable attempt to correct obvious errors, but I expect that there are errors of grammar and possibly content that I did not discover before finalizing the note.    This note was electronically signed by SJ Hammonds PA-C

## 2021-07-16 LAB
BACTERIA UR CULT: NORMAL
SIGNIFICANT IND 70042: NORMAL
SITE SITE: NORMAL
SOURCE SOURCE: NORMAL

## 2022-10-18 ENCOUNTER — APPOINTMENT (OUTPATIENT)
Dept: OBGYN | Facility: CLINIC | Age: 27
End: 2022-10-18
Payer: COMMERCIAL

## 2022-10-20 ENCOUNTER — APPOINTMENT (OUTPATIENT)
Dept: OBGYN | Facility: CLINIC | Age: 27
End: 2022-10-20
Payer: COMMERCIAL

## 2024-11-13 ENCOUNTER — HOSPITAL ENCOUNTER (OUTPATIENT)
Facility: MEDICAL CENTER | Age: 29
End: 2024-11-13
Payer: COMMERCIAL

## 2024-11-13 ENCOUNTER — OFFICE VISIT (OUTPATIENT)
Dept: URGENT CARE | Facility: PHYSICIAN GROUP | Age: 29
End: 2024-11-13
Payer: COMMERCIAL

## 2024-11-13 ENCOUNTER — HOSPITAL ENCOUNTER (OUTPATIENT)
Dept: RADIOLOGY | Facility: MEDICAL CENTER | Age: 29
End: 2024-11-13
Payer: COMMERCIAL

## 2024-11-13 VITALS
HEIGHT: 59 IN | HEART RATE: 59 BPM | RESPIRATION RATE: 16 BRPM | TEMPERATURE: 97 F | BODY MASS INDEX: 34.77 KG/M2 | SYSTOLIC BLOOD PRESSURE: 110 MMHG | WEIGHT: 172.5 LBS | OXYGEN SATURATION: 97 % | DIASTOLIC BLOOD PRESSURE: 68 MMHG

## 2024-11-13 DIAGNOSIS — R10.2 PELVIC PAIN: ICD-10-CM

## 2024-11-13 LAB
APPEARANCE UR: CLEAR
BILIRUB UR STRIP-MCNC: NORMAL MG/DL
CANDIDA DNA VAG QL PROBE+SIG AMP: NEGATIVE
COLOR UR AUTO: YELLOW
G VAGINALIS DNA VAG QL PROBE+SIG AMP: NEGATIVE
GLUCOSE UR STRIP.AUTO-MCNC: NORMAL MG/DL
KETONES UR STRIP.AUTO-MCNC: NORMAL MG/DL
LEUKOCYTE ESTERASE UR QL STRIP.AUTO: NORMAL
NITRITE UR QL STRIP.AUTO: NORMAL
PH UR STRIP.AUTO: 6 [PH] (ref 5–8)
POCT INT CON NEG: NEGATIVE
POCT INT CON POS: POSITIVE
POCT URINE PREGNANCY TEST: NEGATIVE
PROT UR QL STRIP: NORMAL MG/DL
RBC UR QL AUTO: NORMAL
SP GR UR STRIP.AUTO: 1.01
T VAGINALIS DNA VAG QL PROBE+SIG AMP: NEGATIVE
UROBILINOGEN UR STRIP-MCNC: 0.2 MG/DL

## 2024-11-13 PROCEDURE — 87086 URINE CULTURE/COLONY COUNT: CPT

## 2024-11-13 PROCEDURE — 87563 M. GENITALIUM AMP PROBE: CPT

## 2024-11-13 PROCEDURE — 81002 URINALYSIS NONAUTO W/O SCOPE: CPT

## 2024-11-13 PROCEDURE — 74176 CT ABD & PELVIS W/O CONTRAST: CPT

## 2024-11-13 PROCEDURE — 81025 URINE PREGNANCY TEST: CPT

## 2024-11-13 PROCEDURE — 3074F SYST BP LT 130 MM HG: CPT

## 2024-11-13 PROCEDURE — 99204 OFFICE O/P NEW MOD 45 MIN: CPT

## 2024-11-13 PROCEDURE — 87510 GARDNER VAG DNA DIR PROBE: CPT

## 2024-11-13 PROCEDURE — 1125F AMNT PAIN NOTED PAIN PRSNT: CPT

## 2024-11-13 PROCEDURE — 87798 DETECT AGENT NOS DNA AMP: CPT | Mod: 91

## 2024-11-13 PROCEDURE — 3078F DIAST BP <80 MM HG: CPT

## 2024-11-13 PROCEDURE — 87660 TRICHOMONAS VAGIN DIR PROBE: CPT

## 2024-11-13 PROCEDURE — 87480 CANDIDA DNA DIR PROBE: CPT

## 2024-11-13 RX ORDER — ONDANSETRON 4 MG/1
4 TABLET, ORALLY DISINTEGRATING ORAL ONCE
Status: COMPLETED | OUTPATIENT
Start: 2024-11-13 | End: 2024-11-13

## 2024-11-13 RX ORDER — KETOROLAC TROMETHAMINE 15 MG/ML
15 INJECTION, SOLUTION INTRAMUSCULAR; INTRAVENOUS ONCE
Status: COMPLETED | OUTPATIENT
Start: 2024-11-13 | End: 2024-11-13

## 2024-11-13 RX ORDER — ACETAMINOPHEN 500 MG
500 TABLET ORAL ONCE
Status: COMPLETED | OUTPATIENT
Start: 2024-11-13 | End: 2024-11-13

## 2024-11-13 RX ADMIN — ONDANSETRON 4 MG: 4 TABLET, ORALLY DISINTEGRATING ORAL at 09:17

## 2024-11-13 RX ADMIN — Medication 500 MG: at 09:16

## 2024-11-13 RX ADMIN — KETOROLAC TROMETHAMINE 15 MG: 15 INJECTION, SOLUTION INTRAMUSCULAR; INTRAVENOUS at 09:18

## 2024-11-13 ASSESSMENT — ENCOUNTER SYMPTOMS
WEIGHT LOSS: 0
CONSTIPATION: 0
FLANK PAIN: 1
ABDOMINAL PAIN: 1
NAUSEA: 1
BLOOD IN STOOL: 0
DIARRHEA: 0
MYALGIAS: 0
FEVER: 0
VOMITING: 0
COUGH: 0
SHORTNESS OF BREATH: 0

## 2024-11-13 ASSESSMENT — PAIN SCALES - GENERAL: PAINLEVEL_OUTOF10: 7=MODERATE-SEVERE PAIN

## 2024-11-13 NOTE — LETTER
[facility] [place]  [address]  [place] [address]     November 13, 2024    Patient: Sandra Rouse   YOB: 1995   Date of Visit: 11/13/2024       To Whom It May Concern:    Sandra Rouse was seen and treated in our department on 11/13/2024.     Sincerely,     MIKE Adorno.

## 2024-11-13 NOTE — PROGRESS NOTES
Chief Complaint   Patient presents with    Pelvic Pain     X 3 days pain radiates to her left leg        HISTORY OF PRESENT ILLNESS: Patient is a pleasant 29 y.o. female who presents to urgent care today comes in today stating she is having extreme pelvic pain especially to the left side and radiates down the left leg at times.  She notes some left sided lower back pain as well, some nausea, no vomiting.  Patient is not currently on birth control, she is unsure if she is pregnant.  She states her last menstrual cycle was on October 18.  She denies any pain with urination, no major vaginal discharge.  She is currently sexually active.  She denies any foreign objects.    Patient Active Problem List    Diagnosis Date Noted    Encounter for IUD insertion 03/18/2019       Allergies:Latex    No current Epic-ordered outpatient medications on file.     No current Epic-ordered facility-administered medications on file.       History reviewed. No pertinent past medical history.    Social History     Tobacco Use    Smoking status: Never    Smokeless tobacco: Never   Substance Use Topics    Alcohol use: Yes    Drug use: No       Family Status   Relation Name Status    Mo  Alive    Fa  Alive    Bro  Alive    MAunt  Alive    MGMo  (Not Specified)    PGMo  (Not Specified)    Neg Hx  (Not Specified)   No partnership data on file     Family History   Problem Relation Age of Onset    Thyroid Mother     No Known Problems Father     Breast Cancer Maternal Aunt 39    Hypertension Maternal Grandmother     Heart Disease Paternal Grandmother     Diabetes Neg Hx     Depression Neg Hx     Heart Attack Neg Hx     Stroke Neg Hx        Review of Systems   Constitutional:  Negative for fever and weight loss.   Respiratory:  Negative for cough and shortness of breath.    Cardiovascular:  Negative for chest pain.   Gastrointestinal:  Positive for abdominal pain and nausea. Negative for blood in stool, constipation, diarrhea and vomiting.  "  Genitourinary:  Positive for flank pain. Negative for dysuria, frequency and urgency.   Musculoskeletal:  Negative for myalgias.       Exam:  /68 (BP Location: Right arm, Patient Position: Sitting, BP Cuff Size: Adult)   Pulse (!) 59   Temp 36.1 °C (97 °F) (Temporal)   Resp 16   Ht 1.499 m (4' 11\")   Wt 78.2 kg (172 lb 8 oz)   SpO2 97%   Physical Exam  Vitals reviewed.   Constitutional:       Appearance: Normal appearance. She is normal weight. She is not toxic-appearing.   HENT:      Head: Normocephalic.      Mouth/Throat:      Mouth: Mucous membranes are moist.      Pharynx: Oropharynx is clear.   Eyes:      Extraocular Movements: Extraocular movements intact.      Conjunctiva/sclera: Conjunctivae normal.      Pupils: Pupils are equal, round, and reactive to light.   Cardiovascular:      Rate and Rhythm: Normal rate and regular rhythm.      Pulses: Normal pulses.      Heart sounds: Normal heart sounds. No murmur heard.  Pulmonary:      Effort: Pulmonary effort is normal. No respiratory distress.      Breath sounds: Normal breath sounds.   Abdominal:      General: Abdomen is flat. Bowel sounds are normal.      Palpations: Abdomen is soft. There is no mass.      Tenderness: There is abdominal tenderness in the right lower quadrant and left lower quadrant. There is no right CVA tenderness, left CVA tenderness, guarding or rebound.      Comments: Left lower quadrant   Genitourinary:     Vagina: Vaginal discharge and bleeding present.      Cervix: Discharge present. No erythema.      Comments: Patient appears to have started her menstrual cycle today  Musculoskeletal:         General: Normal range of motion.      Right lower leg: No edema.      Left lower leg: No edema.   Skin:     General: Skin is warm and dry.      Capillary Refill: Capillary refill takes less than 2 seconds.      Findings: No bruising, erythema, lesion or rash.   Neurological:      General: No focal deficit present.      Mental Status: " She is alert.      Motor: No weakness.   Psychiatric:         Mood and Affect: Mood normal.         Behavior: Behavior normal.         Thought Content: Thought content normal.         Judgment: Judgment normal.         Assessment/Plan:  1. Pelvic pain  - POCT Urinalysis  - POCT Pregnancy  - VAGINAL PATHOGENS DNA PANEL; Future  - Chlamydia/GC, PCR (Genital/Anal swab); Future  - UROGENITAL UREAPLASMA/MYCOPLASMA, PCR; Future  - CT-ABDOMEN-PELVIS W/O; Future  - Referral to Gynecology  - ketorolac (Toradol) 15 MG/ML injection 15 mg  - acetaminophen (Tylenol) tablet 500 mg  - ondansetron (Zofran ODT) dispertab 4 mg  - URINE CULTURE(NEW); Future  - PELVIC EXAM    Given the nature of the patient's complaint along with review of systems I did go ahead and provide a pelvic exam today.  Patient is noted to have started her menstrual cycle today.  No major abnormalities noted to her cervix, I did provide swabbing in the form of vaginal pathogens, GC chlamydia as well as mycoplasma.  I did check her urine as well, this showed trace leuks and a moderate amount of blood, I do believe this is likely from her menstrual cycle.  Will send for culture to rule out.  POCT pregnancy was negative.  Pelvic exam appeared otherwise normal.  She does have some left-sided lower quadrant tenderness, this certainly could be unrelated to her uterus, she could have a reticulitis as well given the nature of the pain along with nausea.  CT of the abdomen and pelvis was therefore ordered.  Patient provided with Zofran for the ongoing nausea, Toradol and Tylenol for pain and comfort measures.  Reviewed plan of care at length along with differential diagnoses.  Patient is extremely concerned about the potential for endometriosis, I did place a referral for ongoing management.     CT appears within normal limits as well as Vach pathogens.  Patient was notified and spoken with extensively on the phone.    Supportive care, differential diagnoses, and  indications for immediate follow-up discussed with patient.   Pathogenesis of diagnosis discussed including typical length and natural progression.   Instructed to return to clinic or nearest emergency department for any change in condition, further concerns, or worsening of symptoms.  Patient states understanding of the plan of care and discharge instructions.  Instructed to make an appointment, for follow up, with primary care provider.    Please note that this dictation was created using voice recognition software. I have made every reasonable attempt to correct obvious errors, but I expect that there are errors of grammar and possibly content that I did not discover before finalizing the note.      Julissa GIBSON

## 2024-11-15 LAB
BACTERIA UR CULT: NORMAL
SIGNIFICANT IND 70042: NORMAL
SITE SITE: NORMAL
SOURCE SOURCE: NORMAL

## 2024-11-16 LAB
M GENITALIUM DNA SPEC QL NAA+PROBE: NOT DETECTED
M HOMINIS DNA SPEC QL NAA+PROBE: NOT DETECTED
SPECIMEN SOURCE: NORMAL
U PARVUM DNA SPEC QL NAA+PROBE: NOT DETECTED
U UREALYTICUM DNA SPEC QL NAA+PROBE: NOT DETECTED